# Patient Record
Sex: MALE | Race: WHITE | NOT HISPANIC OR LATINO | URBAN - METROPOLITAN AREA
[De-identification: names, ages, dates, MRNs, and addresses within clinical notes are randomized per-mention and may not be internally consistent; named-entity substitution may affect disease eponyms.]

---

## 2020-10-13 ENCOUNTER — APPOINTMENT (RX ONLY)
Dept: URBAN - METROPOLITAN AREA OTHER 5 | Facility: OTHER | Age: 65
Setting detail: DERMATOLOGY
End: 2020-10-13

## 2020-10-13 DIAGNOSIS — D22 MELANOCYTIC NEVI: ICD-10-CM

## 2020-10-13 DIAGNOSIS — L82.1 OTHER SEBORRHEIC KERATOSIS: ICD-10-CM

## 2020-10-13 DIAGNOSIS — Z87.2 PERSONAL HISTORY OF DISEASES OF THE SKIN AND SUBCUTANEOUS TISSUE: ICD-10-CM

## 2020-10-13 PROBLEM — D22.61 MELANOCYTIC NEVI OF RIGHT UPPER LIMB, INCLUDING SHOULDER: Status: ACTIVE | Noted: 2020-10-13

## 2020-10-13 PROCEDURE — ? COUNSELING

## 2020-10-13 PROCEDURE — 99203 OFFICE O/P NEW LOW 30 MIN: CPT

## 2020-10-13 ASSESSMENT — LOCATION SIMPLE DESCRIPTION DERM
LOCATION SIMPLE: RIGHT THUMB
LOCATION SIMPLE: SCALP
LOCATION SIMPLE: RIGHT UPPER BACK
LOCATION SIMPLE: RIGHT LOWER BACK
LOCATION SIMPLE: CHEST

## 2020-10-13 ASSESSMENT — LOCATION DETAILED DESCRIPTION DERM
LOCATION DETAILED: RIGHT DORSAL THUMB METACARPOPHALANGEAL JOINT
LOCATION DETAILED: RIGHT SUPERIOR MEDIAL LOWER BACK
LOCATION DETAILED: RIGHT MID-UPPER BACK
LOCATION DETAILED: STERNUM
LOCATION DETAILED: LEFT SUPERIOR PARIETAL SCALP

## 2020-10-13 ASSESSMENT — LOCATION ZONE DERM
LOCATION ZONE: TRUNK
LOCATION ZONE: FINGER
LOCATION ZONE: SCALP

## 2021-02-05 ENCOUNTER — APPOINTMENT (RX ONLY)
Dept: URBAN - METROPOLITAN AREA OTHER 5 | Facility: OTHER | Age: 66
Setting detail: DERMATOLOGY
End: 2021-02-05

## 2021-02-05 DIAGNOSIS — L259 CONTACT DERMATITIS AND OTHER ECZEMA, UNSPECIFIED CAUSE: ICD-10-CM

## 2021-02-05 DIAGNOSIS — D69.2 OTHER NONTHROMBOCYTOPENIC PURPURA: ICD-10-CM

## 2021-02-05 PROBLEM — L23.9 ALLERGIC CONTACT DERMATITIS, UNSPECIFIED CAUSE: Status: ACTIVE | Noted: 2021-02-05

## 2021-02-05 PROCEDURE — ? PRESCRIPTION

## 2021-02-05 PROCEDURE — ? COUNSELING

## 2021-02-05 PROCEDURE — 99213 OFFICE O/P EST LOW 20 MIN: CPT

## 2021-02-05 RX ORDER — DESONIDE 0.5 MG/G
CREAM TOPICAL BID X 2 WEEKS
Qty: 15 | Refills: 3 | Status: ERX | COMMUNITY
Start: 2021-02-05

## 2021-02-05 RX ADMIN — DESONIDE: 0.5 CREAM TOPICAL at 00:00

## 2021-02-05 ASSESSMENT — LOCATION SIMPLE DESCRIPTION DERM
LOCATION SIMPLE: LEFT CHEEK
LOCATION SIMPLE: RIGHT FOREARM

## 2021-02-05 ASSESSMENT — LOCATION DETAILED DESCRIPTION DERM
LOCATION DETAILED: LEFT SUPERIOR CENTRAL MALAR CHEEK
LOCATION DETAILED: RIGHT DISTAL DORSAL FOREARM

## 2021-02-05 ASSESSMENT — LOCATION ZONE DERM
LOCATION ZONE: FACE
LOCATION ZONE: ARM

## 2021-04-29 ENCOUNTER — OFFICE VISIT (OUTPATIENT)
Dept: URBAN - METROPOLITAN AREA TELEHEALTH 2 | Facility: TELEHEALTH | Age: 66
End: 2021-04-29
Payer: COMMERCIAL

## 2021-04-29 ENCOUNTER — LAB OUTSIDE AN ENCOUNTER (OUTPATIENT)
Dept: URBAN - METROPOLITAN AREA TELEHEALTH 2 | Facility: TELEHEALTH | Age: 66
End: 2021-04-29

## 2021-04-29 ENCOUNTER — TELEPHONE ENCOUNTER (OUTPATIENT)
Dept: URBAN - METROPOLITAN AREA CLINIC 92 | Facility: CLINIC | Age: 66
End: 2021-04-29

## 2021-04-29 DIAGNOSIS — K21.9 GERD (GASTROESOPHAGEAL REFLUX DISEASE): ICD-10-CM

## 2021-04-29 DIAGNOSIS — R93.5 ABNORMAL ABDOMINAL CT SCAN: ICD-10-CM

## 2021-04-29 DIAGNOSIS — R05 COUGH: ICD-10-CM

## 2021-04-29 PROCEDURE — 99245 OFF/OP CONSLTJ NEW/EST HI 55: CPT | Performed by: INTERNAL MEDICINE

## 2021-04-29 PROCEDURE — 99205 OFFICE O/P NEW HI 60 MIN: CPT | Performed by: INTERNAL MEDICINE

## 2021-04-29 RX ORDER — PANTOPRAZOLE SODIUM 40 MG/1
1 TABLET TABLET, DELAYED RELEASE ORAL ONCE A DAY
Qty: 90 | Refills: 4 | OUTPATIENT
Start: 2021-04-29

## 2021-04-29 NOTE — HPI-TODAY'S VISIT:
Pt here for evaluation of GERD and abnormal CT scan of abdomen. . Pt is referred by Dr. Brayan Smith and Dr. Trae Hadley for evauation of abnormal thickening seen on CT scan;  a copy of the note was sent to the referring doctors.   . Pt reports that he had COVID and was hospitalized in 2020 for long time and got better after convalescent plasma.  He has prior h/o hiatal hernia seen on CT scan.  Has had issues of sinus infection and runny nose in the past.  Today on 4/29/2021, pt reports the has chronic dry cough.  He has been seeing Dr. Hawkins for pulmonary issues.  He has reflux with spicy foods. No nausea, trouble swallowing, no hiccups, no diarrhea, no abdominal pain. . PMH: sleep apnea, DM, CAD, prostate cancer (in remission), HTN SH: no smoke or etoh.  [place]/makes awards Meds; simvastatin, tamsulosin, junevia, protonix 40mg (just started), asprin, mvi FH: No GI malignancy .

## 2021-05-13 ENCOUNTER — CLAIMS CREATED FROM THE CLAIM WINDOW (OUTPATIENT)
Dept: URBAN - METROPOLITAN AREA CLINIC 4 | Facility: CLINIC | Age: 66
End: 2021-05-13
Payer: COMMERCIAL

## 2021-05-13 ENCOUNTER — OFFICE VISIT (OUTPATIENT)
Dept: URBAN - METROPOLITAN AREA SURGERY CENTER 18 | Facility: SURGERY CENTER | Age: 66
End: 2021-05-13
Payer: COMMERCIAL

## 2021-05-13 DIAGNOSIS — K31.89 ACQUIRED DEFORMITY OF DUODENUM: ICD-10-CM

## 2021-05-13 DIAGNOSIS — R93.3 ABN FINDINGS-GI TRACT: ICD-10-CM

## 2021-05-13 DIAGNOSIS — K31.89 GASTRIC PERFORATION WITHOUT ULCER: ICD-10-CM

## 2021-05-13 PROCEDURE — G8907 PT DOC NO EVENTS ON DISCHARG: HCPCS | Performed by: INTERNAL MEDICINE

## 2021-05-13 PROCEDURE — 88312 SPECIAL STAINS GROUP 1: CPT | Performed by: PATHOLOGY

## 2021-05-13 PROCEDURE — 43239 EGD BIOPSY SINGLE/MULTIPLE: CPT | Performed by: INTERNAL MEDICINE

## 2021-05-13 PROCEDURE — 88305 TISSUE EXAM BY PATHOLOGIST: CPT | Performed by: PATHOLOGY

## 2021-05-13 RX ORDER — PANTOPRAZOLE SODIUM 40 MG/1
1 TABLET TABLET, DELAYED RELEASE ORAL ONCE A DAY
Status: DISCONTINUED | COMMUNITY

## 2021-05-13 RX ORDER — SITAGLIPTIN 100 MG/1
1 TABLET TABLET, FILM COATED ORAL ONCE A DAY
Status: ACTIVE | COMMUNITY

## 2021-05-13 RX ORDER — GLUCOSAMINE/CHONDR SU A SOD 750-600 MG
1 CAPSULE TABLET ORAL ONCE A DAY
Status: ACTIVE | COMMUNITY

## 2021-05-13 RX ORDER — SIMVASTATIN 40 MG/1
1 TABLET IN THE EVENING TABLET, FILM COATED ORAL ONCE A DAY
Status: ACTIVE | COMMUNITY

## 2021-05-13 RX ORDER — ASCORBIC ACID 1000 MG
1 TABLET TABLET ORAL ONCE A DAY
Status: ACTIVE | COMMUNITY

## 2021-05-13 RX ORDER — METFORMIN HYDROCHLORIDE 500 MG/1
1 TABLET WITH A MEAL TABLET, FILM COATED ORAL ONCE A DAY
Status: ACTIVE | COMMUNITY

## 2021-05-13 RX ORDER — ASPIRIN 81 MG/1
1 TABLET TABLET, CHEWABLE ORAL ONCE A DAY
Status: ACTIVE | COMMUNITY

## 2021-05-13 RX ORDER — TAMSULOSIN HYDROCHLORIDE 0.4 MG/1
1 CAPSULE CAPSULE ORAL ONCE A DAY
Status: ACTIVE | COMMUNITY

## 2021-05-13 RX ORDER — PANTOPRAZOLE SODIUM 40 MG/1
1 TABLET TABLET, DELAYED RELEASE ORAL ONCE A DAY
Qty: 90 | Refills: 4 | COMMUNITY
Start: 2021-04-29

## 2021-05-13 RX ORDER — LOSARTAN POTASSIUM 25 MG/1
1 TABLET TABLET ORAL ONCE A DAY
Status: ACTIVE | COMMUNITY

## 2022-05-16 ENCOUNTER — TELEPHONE ENCOUNTER (OUTPATIENT)
Dept: URBAN - METROPOLITAN AREA CLINIC 92 | Facility: CLINIC | Age: 67
End: 2022-05-16

## 2022-05-16 ENCOUNTER — LAB OUTSIDE AN ENCOUNTER (OUTPATIENT)
Dept: URBAN - METROPOLITAN AREA TELEHEALTH 2 | Facility: TELEHEALTH | Age: 67
End: 2022-05-16

## 2022-05-16 ENCOUNTER — OFFICE VISIT (OUTPATIENT)
Dept: URBAN - METROPOLITAN AREA TELEHEALTH 2 | Facility: TELEHEALTH | Age: 67
End: 2022-05-16
Payer: COMMERCIAL

## 2022-05-16 DIAGNOSIS — R05.8 DRY COUGH: ICD-10-CM

## 2022-05-16 DIAGNOSIS — K21.9 GERD (GASTROESOPHAGEAL REFLUX DISEASE): ICD-10-CM

## 2022-05-16 DIAGNOSIS — D50.8 ACQUIRED IRON DEFICIENCY ANEMIA DUE TO DECREASED ABSORPTION: ICD-10-CM

## 2022-05-16 DIAGNOSIS — R93.5 ABNORMAL ABDOMINAL CT SCAN: ICD-10-CM

## 2022-05-16 DIAGNOSIS — Z12.11 SCREEN FOR COLON CANCER: ICD-10-CM

## 2022-05-16 PROBLEM — 87522002 IRON DEFICIENCY ANEMIA: Status: ACTIVE | Noted: 2022-05-16

## 2022-05-16 PROCEDURE — 99214 OFFICE O/P EST MOD 30 MIN: CPT | Performed by: INTERNAL MEDICINE

## 2022-05-16 RX ORDER — LOSARTAN POTASSIUM 25 MG/1
1 TABLET TABLET ORAL ONCE A DAY
Status: ACTIVE | COMMUNITY

## 2022-05-16 RX ORDER — PANTOPRAZOLE SODIUM 40 MG/1
1 TABLET TABLET, DELAYED RELEASE ORAL ONCE A DAY
Qty: 90 | Refills: 4 | COMMUNITY
Start: 2021-04-29

## 2022-05-16 RX ORDER — PANTOPRAZOLE SODIUM 40 MG/1
1 TABLET TABLET, DELAYED RELEASE ORAL ONCE A DAY
Qty: 90 | Refills: 4 | OUTPATIENT

## 2022-05-16 RX ORDER — GLUCOSAMINE/CHONDR SU A SOD 750-600 MG
1 CAPSULE TABLET ORAL ONCE A DAY
Status: ACTIVE | COMMUNITY

## 2022-05-16 RX ORDER — SIMVASTATIN 40 MG/1
1 TABLET IN THE EVENING TABLET, FILM COATED ORAL ONCE A DAY
Status: ACTIVE | COMMUNITY

## 2022-05-16 RX ORDER — METFORMIN HYDROCHLORIDE 500 MG/1
1 TABLET WITH A MEAL TABLET, FILM COATED ORAL ONCE A DAY
Status: ACTIVE | COMMUNITY

## 2022-05-16 RX ORDER — ASPIRIN 81 MG/1
1 TABLET TABLET, CHEWABLE ORAL ONCE A DAY
Status: ACTIVE | COMMUNITY

## 2022-05-16 RX ORDER — SITAGLIPTIN 100 MG/1
1 TABLET TABLET, FILM COATED ORAL ONCE A DAY
Status: ACTIVE | COMMUNITY

## 2022-05-16 RX ORDER — TAMSULOSIN HYDROCHLORIDE 0.4 MG/1
1 CAPSULE CAPSULE ORAL ONCE A DAY
Status: ACTIVE | COMMUNITY

## 2022-05-16 RX ORDER — ASCORBIC ACID 1000 MG
1 TABLET TABLET ORAL ONCE A DAY
Status: ACTIVE | COMMUNITY

## 2022-05-16 NOTE — HPI-TODAY'S VISIT:
Pt here for evaluation of GERD and abnormal CT scan of abdomen. . Pt is referred by Dr. Brayan Smith and Dr. Trae Hadley for evauation of abnormal thickening seen on CT scan;  a copy of the note was sent to the referring doctors.  . Pt reports that he had COVID and was hospitalized in 2020 for long time and got better after convalescent plasma.  He has prior h/o hiatal hernia seen on CT scan.  Has had issues of sinus infection and runny nose in the past.  Previously on 4/29/2021, pt reports the has chronic dry cough.  He has been seeing Dr. Hawkins for pulmonary issues.  He has reflux with spicy foods. No nausea, trouble swallowing, no hiccups, no diarrhea, no abdominal pain. . Today on 5/16/2022, pt reports that his reflux is very well controlled on protonix. His Hgb is going down again.  He has been donating plasma/platelets  (due to having AB for covid). . PMH: sleep apnea, DM, CAD, prostate cancer (in remission), HTN SH: no smoke or etoh.  [place]/makes awards Meds; simvastatin, tamsulosin, junevia, protonix 40mg (just started), asprin, mvi FH: No GI malignancy .  5/2021: Stomach, Biopsy: NO SIGNIFICANT ABNORMALITY. . The examined esophagus was normal. The examined duodenum was normal. : Normal Diffuse moderate inflammation characterized by erosions and erythema was found in the entire examined stomach. Biopsies were taken with a cold forceps for histology. The pathology specimen was placed into Bottle Number 1. A medium amount of food (residue) was found in the entire examined stomach. . Biopsies: normal.   . OSH HGB: 11/2021: 14.9, 12/2021 14.1; March 2022:13.3

## 2022-05-18 ENCOUNTER — APPOINTMENT (RX ONLY)
Dept: URBAN - METROPOLITAN AREA OTHER 4 | Facility: OTHER | Age: 67
Setting detail: DERMATOLOGY
End: 2022-05-18

## 2022-05-18 DIAGNOSIS — L82.0 INFLAMED SEBORRHEIC KERATOSIS: ICD-10-CM

## 2022-05-18 PROCEDURE — ? LIQUID NITROGEN

## 2022-05-18 PROCEDURE — 17110 DESTRUCTION B9 LES UP TO 14: CPT

## 2022-05-18 PROCEDURE — ? COUNSELING

## 2022-05-18 ASSESSMENT — LOCATION ZONE DERM: LOCATION ZONE: SCALP

## 2022-05-18 ASSESSMENT — LOCATION DETAILED DESCRIPTION DERM: LOCATION DETAILED: RIGHT CENTRAL FRONTAL SCALP

## 2022-05-18 ASSESSMENT — LOCATION SIMPLE DESCRIPTION DERM: LOCATION SIMPLE: RIGHT SCALP

## 2022-05-18 NOTE — PROCEDURE: LIQUID NITROGEN
Medical Necessity Information: It is in your best interest to select a reason for this procedure from the list below. All of these items fulfill various CMS LCD requirements except the new and changing color options.
Add 52 Modifier (Optional): no
Consent: Pt advised on the following including but not limited to risks of crusting, scabbing, blistering, scarring, darker or lighter pigmentary change, recurrence, incomplete removal and infection.
Render Post-Care Instructions In Note?: yes
Detail Level: Simple
Pared With?: 15 blade
Medical Necessity Clause: This procedure was medically necessary because the lesions that were treated were:
Duration Of Freeze Thaw-Cycle (Seconds): 5-10
Post-Care Instructions: I reviewed with the patient in detail post-care instructions. Patient is to wear sunprotection, and avoid picking at any of the treated lesions. Pt may apply Vaseline to crusted or scabbing areas.
Spray Paint Text: The liquid nitrogen was applied to the skin utilizing a spray paint frosting technique.

## 2022-06-01 ENCOUNTER — TELEPHONE ENCOUNTER (OUTPATIENT)
Dept: URBAN - METROPOLITAN AREA CLINIC 92 | Facility: CLINIC | Age: 67
End: 2022-06-01

## 2022-06-01 RX ORDER — POLYETHYLENE GLYCOL 3350, SODIUM SULFATE, SODIUM CHLORIDE, POTASSIUM CHLORIDE, ASCORBIC ACID, SODIUM ASCORBATE 140-9-5.2G
1 KIT KIT ORAL ONCE
Qty: 1 | Refills: 1 | OUTPATIENT
Start: 2022-06-01 | End: 2022-06-03

## 2022-06-27 ENCOUNTER — TELEPHONE ENCOUNTER (OUTPATIENT)
Dept: URBAN - METROPOLITAN AREA CLINIC 92 | Facility: CLINIC | Age: 67
End: 2022-06-27

## 2022-06-27 RX ORDER — PANTOPRAZOLE SODIUM 40 MG/1
1 TABLET TABLET, DELAYED RELEASE ORAL ONCE A DAY
Qty: 90 | Refills: 4

## 2022-07-06 PROBLEM — 235595009 GASTROESOPHAGEAL REFLUX DISEASE: Status: ACTIVE | Noted: 2021-04-29

## 2022-07-15 ENCOUNTER — WEB ENCOUNTER (OUTPATIENT)
Dept: URBAN - METROPOLITAN AREA SURGERY CENTER 18 | Facility: SURGERY CENTER | Age: 67
End: 2022-07-15

## 2022-07-19 ENCOUNTER — WEB ENCOUNTER (OUTPATIENT)
Dept: URBAN - METROPOLITAN AREA SURGERY CENTER 18 | Facility: SURGERY CENTER | Age: 67
End: 2022-07-19

## 2022-07-21 ENCOUNTER — CLAIMS CREATED FROM THE CLAIM WINDOW (OUTPATIENT)
Dept: URBAN - METROPOLITAN AREA CLINIC 4 | Facility: CLINIC | Age: 67
End: 2022-07-21
Payer: COMMERCIAL

## 2022-07-21 ENCOUNTER — OFFICE VISIT (OUTPATIENT)
Dept: URBAN - METROPOLITAN AREA SURGERY CENTER 18 | Facility: SURGERY CENTER | Age: 67
End: 2022-07-21
Payer: COMMERCIAL

## 2022-07-21 DIAGNOSIS — K63.89 OTHER SPECIFIED DISEASES OF INTESTINE: ICD-10-CM

## 2022-07-21 DIAGNOSIS — D50.0 ANEMIA: ICD-10-CM

## 2022-07-21 DIAGNOSIS — D12.3 BENIGN NEOPLASM OF TRANSVERSE COLON: ICD-10-CM

## 2022-07-21 DIAGNOSIS — K63.5 BENIGN COLON POLYP: ICD-10-CM

## 2022-07-21 DIAGNOSIS — K29.70 GASTRITIS, UNSPECIFIED, WITHOUT BLEEDING: ICD-10-CM

## 2022-07-21 DIAGNOSIS — D12.3 ADENOMA OF TRANSVERSE COLON: ICD-10-CM

## 2022-07-21 DIAGNOSIS — Z12.11 COLON CANCER SCREENING: ICD-10-CM

## 2022-07-21 DIAGNOSIS — K31.89 ACQUIRED DEFORMITY OF DUODENUM: ICD-10-CM

## 2022-07-21 DIAGNOSIS — K31.819 ACQUIRED ARTERIOVENOUS MALFORMATION OF STOMACH: ICD-10-CM

## 2022-07-21 DIAGNOSIS — K31.89 OTHER DISEASES OF STOMACH AND DUODENUM: ICD-10-CM

## 2022-07-21 PROCEDURE — 45380 COLONOSCOPY AND BIOPSY: CPT | Performed by: INTERNAL MEDICINE

## 2022-07-21 PROCEDURE — 45385 COLONOSCOPY W/LESION REMOVAL: CPT | Performed by: INTERNAL MEDICINE

## 2022-07-21 PROCEDURE — 88312 SPECIAL STAINS GROUP 1: CPT | Performed by: PATHOLOGY

## 2022-07-21 PROCEDURE — 88305 TISSUE EXAM BY PATHOLOGIST: CPT | Performed by: PATHOLOGY

## 2022-07-21 PROCEDURE — 43239 EGD BIOPSY SINGLE/MULTIPLE: CPT | Performed by: INTERNAL MEDICINE

## 2022-07-21 PROCEDURE — G8907 PT DOC NO EVENTS ON DISCHARG: HCPCS | Performed by: INTERNAL MEDICINE

## 2022-07-21 RX ORDER — ASCORBIC ACID 1000 MG
1 TABLET TABLET ORAL ONCE A DAY
Status: ACTIVE | COMMUNITY

## 2022-07-21 RX ORDER — SITAGLIPTIN 100 MG/1
1 TABLET TABLET, FILM COATED ORAL ONCE A DAY
Status: ACTIVE | COMMUNITY

## 2022-07-21 RX ORDER — PANTOPRAZOLE SODIUM 40 MG/1
1 TABLET TABLET, DELAYED RELEASE ORAL ONCE A DAY
Qty: 90 | Refills: 4 | Status: ACTIVE | COMMUNITY

## 2022-07-21 RX ORDER — GLUCOSAMINE/CHONDR SU A SOD 750-600 MG
1 CAPSULE TABLET ORAL ONCE A DAY
Status: ACTIVE | COMMUNITY

## 2022-07-21 RX ORDER — ASPIRIN 81 MG/1
1 TABLET TABLET, CHEWABLE ORAL ONCE A DAY
Status: ACTIVE | COMMUNITY

## 2022-07-21 RX ORDER — LOSARTAN POTASSIUM 25 MG/1
1 TABLET TABLET ORAL ONCE A DAY
Status: ACTIVE | COMMUNITY

## 2022-07-21 RX ORDER — TAMSULOSIN HYDROCHLORIDE 0.4 MG/1
1 CAPSULE CAPSULE ORAL ONCE A DAY
Status: ACTIVE | COMMUNITY

## 2022-07-21 RX ORDER — SIMVASTATIN 40 MG/1
1 TABLET IN THE EVENING TABLET, FILM COATED ORAL ONCE A DAY
Status: ACTIVE | COMMUNITY

## 2022-07-21 RX ORDER — METFORMIN HYDROCHLORIDE 500 MG/1
1 TABLET WITH A MEAL TABLET, FILM COATED ORAL ONCE A DAY
Status: ACTIVE | COMMUNITY

## 2022-07-22 ENCOUNTER — TELEPHONE ENCOUNTER (OUTPATIENT)
Dept: URBAN - METROPOLITAN AREA CLINIC 92 | Facility: CLINIC | Age: 67
End: 2022-07-22

## 2022-07-22 RX ORDER — PANTOPRAZOLE SODIUM 40 MG/1
1 TABLET TABLET, DELAYED RELEASE ORAL ONCE A DAY
Qty: 90 | Refills: 4

## 2022-08-16 ENCOUNTER — TELEPHONE ENCOUNTER (OUTPATIENT)
Dept: URBAN - METROPOLITAN AREA CLINIC 96 | Facility: CLINIC | Age: 67
End: 2022-08-16

## 2023-05-18 ENCOUNTER — APPOINTMENT (RX ONLY)
Dept: URBAN - METROPOLITAN AREA OTHER 5 | Facility: OTHER | Age: 68
Setting detail: DERMATOLOGY
End: 2023-05-18

## 2023-05-18 DIAGNOSIS — L82.1 OTHER SEBORRHEIC KERATOSIS: ICD-10-CM

## 2023-05-18 DIAGNOSIS — L57.8 OTHER SKIN CHANGES DUE TO CHRONIC EXPOSURE TO NONIONIZING RADIATION: ICD-10-CM

## 2023-05-18 DIAGNOSIS — D18.0 HEMANGIOMA: ICD-10-CM

## 2023-05-18 PROBLEM — D18.01 HEMANGIOMA OF SKIN AND SUBCUTANEOUS TISSUE: Status: ACTIVE | Noted: 2023-05-18

## 2023-05-18 PROCEDURE — 99213 OFFICE O/P EST LOW 20 MIN: CPT

## 2023-05-18 PROCEDURE — ? COUNSELING

## 2023-05-18 ASSESSMENT — LOCATION SIMPLE DESCRIPTION DERM
LOCATION SIMPLE: RIGHT SCALP
LOCATION SIMPLE: SCALP
LOCATION SIMPLE: RIGHT UPPER BACK

## 2023-05-18 ASSESSMENT — LOCATION DETAILED DESCRIPTION DERM
LOCATION DETAILED: LEFT SUPERIOR PARIETAL SCALP
LOCATION DETAILED: RIGHT CENTRAL FRONTAL SCALP
LOCATION DETAILED: RIGHT SUPERIOR UPPER BACK

## 2023-05-18 ASSESSMENT — LOCATION ZONE DERM
LOCATION ZONE: TRUNK
LOCATION ZONE: SCALP

## 2023-05-18 ASSESSMENT — PAIN INTENSITY VAS: HOW INTENSE IS YOUR PAIN 0 BEING NO PAIN, 10 BEING THE MOST SEVERE PAIN POSSIBLE?: NO PAIN

## 2023-06-07 ENCOUNTER — APPOINTMENT (RX ONLY)
Dept: URBAN - METROPOLITAN AREA OTHER 5 | Facility: OTHER | Age: 68
Setting detail: DERMATOLOGY
End: 2023-06-07

## 2023-06-07 DIAGNOSIS — L82.0 INFLAMED SEBORRHEIC KERATOSIS: ICD-10-CM

## 2023-06-07 PROCEDURE — ? LIQUID NITROGEN

## 2023-06-07 PROCEDURE — 17110 DESTRUCTION B9 LES UP TO 14: CPT

## 2023-06-07 PROCEDURE — ? COUNSELING

## 2023-06-07 ASSESSMENT — LOCATION ZONE DERM: LOCATION ZONE: SCALP

## 2023-06-07 ASSESSMENT — LOCATION SIMPLE DESCRIPTION DERM: LOCATION SIMPLE: ANTERIOR SCALP

## 2023-06-07 ASSESSMENT — PAIN INTENSITY VAS: HOW INTENSE IS YOUR PAIN 0 BEING NO PAIN, 10 BEING THE MOST SEVERE PAIN POSSIBLE?: NO PAIN

## 2023-06-07 ASSESSMENT — LOCATION DETAILED DESCRIPTION DERM: LOCATION DETAILED: MID-FRONTAL SCALP

## 2023-06-07 NOTE — PROCEDURE: LIQUID NITROGEN
Consent: The patient's consent was obtained including but not limited to risks of crusting, scabbing, blistering, scarring, darker or lighter pigmentary change, recurrence, incomplete removal and infection.
Detail Level: Detailed
Show Topical Anesthesia Variable?: Yes
Medical Necessity Information: It is in your best interest to select a reason for this procedure from the list below. All of these items fulfill various CMS LCD requirements except the new and changing color options.
Post-Care Instructions: I reviewed with the patient in detail post-care instructions. Patient is to wear sunprotection, and avoid picking at any of the treated lesions. Pt may apply Vaseline to crusted or scabbing areas.
Render Post-Care Instructions In Note?: no
Spray Paint Text: The liquid nitrogen was applied to the skin utilizing a spray paint frosting technique.
Medical Necessity Clause: This procedure was medically necessary because the lesions that were treated were:

## 2023-09-05 ENCOUNTER — TELEPHONE ENCOUNTER (OUTPATIENT)
Dept: URBAN - METROPOLITAN AREA SURGERY CENTER 8 | Facility: SURGERY CENTER | Age: 68
End: 2023-09-05

## 2023-09-08 ENCOUNTER — OFFICE VISIT (OUTPATIENT)
Dept: URBAN - METROPOLITAN AREA SURGERY CENTER 8 | Facility: SURGERY CENTER | Age: 68
End: 2023-09-08

## 2023-09-12 ENCOUNTER — TELEPHONE ENCOUNTER (OUTPATIENT)
Dept: URBAN - METROPOLITAN AREA CLINIC 96 | Facility: CLINIC | Age: 68
End: 2023-09-12

## 2023-09-12 RX ORDER — PANTOPRAZOLE SODIUM 40 MG/1
1 TABLET TABLET, DELAYED RELEASE ORAL ONCE A DAY
Qty: 90 | Refills: 4

## 2023-09-20 ENCOUNTER — WEB ENCOUNTER (OUTPATIENT)
Dept: URBAN - METROPOLITAN AREA CLINIC 96 | Facility: CLINIC | Age: 68
End: 2023-09-20

## 2023-09-20 ENCOUNTER — LAB OUTSIDE AN ENCOUNTER (OUTPATIENT)
Dept: URBAN - METROPOLITAN AREA CLINIC 96 | Facility: CLINIC | Age: 68
End: 2023-09-20

## 2023-09-20 ENCOUNTER — OFFICE VISIT (OUTPATIENT)
Dept: URBAN - METROPOLITAN AREA CLINIC 96 | Facility: CLINIC | Age: 68
End: 2023-09-20
Payer: COMMERCIAL

## 2023-09-20 ENCOUNTER — OFFICE VISIT (OUTPATIENT)
Dept: URBAN - METROPOLITAN AREA CLINIC 12 | Facility: CLINIC | Age: 68
End: 2023-09-20

## 2023-09-20 ENCOUNTER — DASHBOARD ENCOUNTERS (OUTPATIENT)
Age: 68
End: 2023-09-20

## 2023-09-20 VITALS
TEMPERATURE: 97.9 F | WEIGHT: 164 LBS | DIASTOLIC BLOOD PRESSURE: 84 MMHG | BODY MASS INDEX: 24.86 KG/M2 | HEIGHT: 68 IN | HEART RATE: 72 BPM | SYSTOLIC BLOOD PRESSURE: 141 MMHG

## 2023-09-20 DIAGNOSIS — K92.1 MELENA: ICD-10-CM

## 2023-09-20 DIAGNOSIS — K21.9 GERD (GASTROESOPHAGEAL REFLUX DISEASE): ICD-10-CM

## 2023-09-20 DIAGNOSIS — R93.5 ABNORMAL ABDOMINAL CT SCAN: ICD-10-CM

## 2023-09-20 DIAGNOSIS — D50.8 OTHER IRON DEFICIENCY ANEMIA: ICD-10-CM

## 2023-09-20 PROCEDURE — 99214 OFFICE O/P EST MOD 30 MIN: CPT | Performed by: INTERNAL MEDICINE

## 2023-09-20 RX ORDER — TAMSULOSIN HYDROCHLORIDE 0.4 MG/1
1 CAPSULE CAPSULE ORAL ONCE A DAY
Status: ACTIVE | COMMUNITY

## 2023-09-20 RX ORDER — LOSARTAN POTASSIUM 25 MG/1
1 TABLET TABLET ORAL ONCE A DAY
Status: ACTIVE | COMMUNITY

## 2023-09-20 RX ORDER — SIMVASTATIN 40 MG/1
1 TABLET IN THE EVENING TABLET, FILM COATED ORAL ONCE A DAY
Status: ACTIVE | COMMUNITY

## 2023-09-20 RX ORDER — PANTOPRAZOLE SODIUM 40 MG/1
1 TABLET TABLET, DELAYED RELEASE ORAL ONCE A DAY
Qty: 90 | Refills: 4 | Status: ACTIVE | COMMUNITY

## 2023-09-20 RX ORDER — ASCORBIC ACID 1000 MG
1 TABLET TABLET ORAL ONCE A DAY
Status: ACTIVE | COMMUNITY

## 2023-09-20 RX ORDER — SITAGLIPTIN 100 MG/1
1 TABLET TABLET, FILM COATED ORAL ONCE A DAY
Status: ACTIVE | COMMUNITY

## 2023-09-20 RX ORDER — ASPIRIN 81 MG/1
1 TABLET TABLET, CHEWABLE ORAL ONCE A DAY
Status: ACTIVE | COMMUNITY

## 2023-09-20 RX ORDER — METFORMIN HYDROCHLORIDE 500 MG/1
1 TABLET WITH A MEAL TABLET, FILM COATED ORAL ONCE A DAY
Status: ACTIVE | COMMUNITY

## 2023-09-20 RX ORDER — GLUCOSAMINE/CHONDR SU A SOD 750-600 MG
1 CAPSULE TABLET ORAL ONCE A DAY
Status: ACTIVE | COMMUNITY

## 2023-09-20 RX ORDER — PANTOPRAZOLE SODIUM 40 MG/1
1 TABLET TABLET, DELAYED RELEASE ORAL EVERY 12 HOURS
Qty: 180 TABLET | Refills: 4 | OUTPATIENT

## 2023-09-20 NOTE — HPI-TODAY'S VISIT:
Pt here for evaluation of GERD and abnormal CT scan of abdomen. . Pt is referred by Dr. Brayan Smith and Dr. Trae Hadley for evauation of abnormal thickening seen on CT scan;  a copy of the note was sent to the referring doctors.  . Pt reports that he had COVID and was hospitalized in 2020 for long time and got better after convalescent plasma.  He has prior h/o hiatal hernia seen on CT scan.  Has had issues of sinus infection and runny nose in the past.  Previously on 4/29/2021, pt reports the has chronic dry cough.  He has been seeing Dr. Hawkins for pulmonary issues.  He has reflux with spicy foods. No nausea, trouble swallowing, no hiccups, no diarrhea, no abdominal pain. . Previously on 5/16/2022, pt reports that his reflux is very well controlled on protonix. His Hgb is going down again.  He has been donating plasma/platelets  (due to having AB for covid). . Today on 9/20/2023, pt reports that he has been having new onset melena daily for several weeks.  Having 2-3 BM per day (new).  Having worsening acid reflux. . PMH: sleep apnea, DM, CAD, prostate cancer (in remission), HTN SH: no smoke or etoh.  [place]/makes awards Meds; simvastatin, tamsulosin, junevia, protonix 40mg (just started), asprin, mvi FH: No GI malignancy .  5/2021: Stomach, Biopsy: NO SIGNIFICANT ABNORMALITY. . The examined esophagus was normal. The examined duodenum was normal. : Normal Diffuse moderate inflammation characterized by erosions and erythema was found in the entire examined stomach. Biopsies were taken with a cold forceps for histology. The pathology specimen was placed into Bottle Number 1. A medium amount of food (residue) was found in the entire examined stomach. . Biopsies: normal.  . OSH HGB: 11/2021: 14.9, 12/2021 14.1; March 2022:13.3 . 7/2022: - A single angiodysplastic lesion in the duodenum. - Normal examined duodenum. Biopsied. - Erythematous mucosa in the antrum. Biopsied. - Normal esophagus. - If Fe def anemia persists, will need to have APC performed to treat the AVM. . A less than 5 mm polyp was found in the cecum. The polyp was sessile. The polyp was removed with a jumbo cold forceps. Resection and retrieval were complete. The pathology specimen was placed into Bottle Number 3. : Single Polyp A less than 5 mm polyp was found in the hepatic flexure. The polyp was sessile. The polyp was removed with a jumbo cold forceps. Resection and retrieval were complete. The pathology specimen was placed into Bottle Number 4. A less than 5 mm polyp was found in the transverse colon. The polyp was sessile. The polyp was removed with a jumbo cold forceps. Resection and retrieval were complete. The pathology specimen was placed into Bottle Number 5. A 6 mm polyp was found in the recto-sigmoid colon. The polyp was sessile. The polyp was removed with a cold snare. Resection and retrieval were complete. The pathology specimen was placed into Bottle Number 6. The pathology specimen was placed into Bottle Number 6. A 6 mm polyp was found in the recto-sigmoid colon. The polyp was sessile. The polyp was removed with a cold snare. Resection and retrieval were complete. The pathology specimen was placed into Bottle Number 7. . A) Duodenum, Biopsy: NO SIGNIFICANT ABNORMALITY. (B) Stomach, Antrum, Biopsy: CHEMICAL/REACTIVE GASTROPATHY. No Evidence of H. Pylori Organisms or Intestinal Metaplasia. Negative for Dysplasia or Malignancy. (C) Cecum, Polypectomy: BENIGN MUCOSAL POLYP(S). See Comment. Negative for Dysplasia or Malignancy. COMMENT: We use "benign mucosal polyp" to refer to an endoscopically polypoid lesion that shows no dysplastic or hyperplastic epithelium or classic features of hamartomatous polyp. (D) Colon, Hepatic Flexure, Polypectomy: TUBULAR ADENOMA(S). (E) Colon, Transverse, Polypectomy: HYPERPLASTIC POLYP(S). (F) Colon, Descending, Polypectomy: HYPERPLASTIC POLYP(S). (G) Colon, Recto-Sigmoid, Polypectomy: HYPERPLASTIC POLYP(S).  - Repeat colonoscopy in 3 years for surveillance. .

## 2023-09-29 ENCOUNTER — LAB OUTSIDE AN ENCOUNTER (OUTPATIENT)
Dept: URBAN - METROPOLITAN AREA CLINIC 96 | Facility: CLINIC | Age: 68
End: 2023-09-29

## 2023-09-29 ENCOUNTER — OFFICE VISIT (OUTPATIENT)
Dept: URBAN - METROPOLITAN AREA MEDICAL CENTER 28 | Facility: MEDICAL CENTER | Age: 68
End: 2023-09-29
Payer: COMMERCIAL

## 2023-09-29 DIAGNOSIS — K31.819 ACQUIRED ARTERIOVENOUS MALFORMATION OF DUODENUM: ICD-10-CM

## 2023-09-29 DIAGNOSIS — K92.1 ACUTE MELENA: ICD-10-CM

## 2023-09-29 DIAGNOSIS — D50.9 ANEMIA: ICD-10-CM

## 2023-09-29 LAB
GLUCOSE POC: 140
PERFORMING LAB: (no result)

## 2023-09-29 PROCEDURE — 43270 EGD LESION ABLATION: CPT | Performed by: INTERNAL MEDICINE

## 2023-09-29 RX ORDER — ASPIRIN 81 MG/1
1 TABLET TABLET, CHEWABLE ORAL ONCE A DAY
Status: ACTIVE | COMMUNITY

## 2023-09-29 RX ORDER — ASCORBIC ACID 1000 MG
1 TABLET TABLET ORAL ONCE A DAY
Status: ACTIVE | COMMUNITY

## 2023-09-29 RX ORDER — METFORMIN HYDROCHLORIDE 500 MG/1
1 TABLET WITH A MEAL TABLET, FILM COATED ORAL ONCE A DAY
Status: ACTIVE | COMMUNITY

## 2023-09-29 RX ORDER — TAMSULOSIN HYDROCHLORIDE 0.4 MG/1
1 CAPSULE CAPSULE ORAL ONCE A DAY
Status: ACTIVE | COMMUNITY

## 2023-09-29 RX ORDER — SITAGLIPTIN 100 MG/1
1 TABLET TABLET, FILM COATED ORAL ONCE A DAY
Status: ACTIVE | COMMUNITY

## 2023-09-29 RX ORDER — GLUCOSAMINE/CHONDR SU A SOD 750-600 MG
1 CAPSULE TABLET ORAL ONCE A DAY
Status: ACTIVE | COMMUNITY

## 2023-09-29 RX ORDER — PANTOPRAZOLE SODIUM 40 MG/1
1 TABLET TABLET, DELAYED RELEASE ORAL EVERY 12 HOURS
Qty: 180 TABLET | Refills: 4 | Status: ACTIVE | COMMUNITY

## 2023-09-29 RX ORDER — SIMVASTATIN 40 MG/1
1 TABLET IN THE EVENING TABLET, FILM COATED ORAL ONCE A DAY
Status: ACTIVE | COMMUNITY

## 2023-09-29 RX ORDER — LOSARTAN POTASSIUM 25 MG/1
1 TABLET TABLET ORAL ONCE A DAY
Status: ACTIVE | COMMUNITY

## 2023-09-29 NOTE — HPI-TODAY'S VISIT:
9/29/2023 - The esophagus was normal.  Findings: - The stomach was normal.  - A single less than 1 mm angioectasia without bleeding was found in the first portion of the duodenum.  Coagulation for  hemostasis of bleeding caused by the procedure using argon plasma was successful.

## 2023-12-01 ENCOUNTER — WEB ENCOUNTER (OUTPATIENT)
Dept: URBAN - METROPOLITAN AREA CLINIC 98 | Facility: CLINIC | Age: 68
End: 2023-12-01

## 2023-12-02 ENCOUNTER — WEB ENCOUNTER (OUTPATIENT)
Dept: URBAN - METROPOLITAN AREA CLINIC 96 | Facility: CLINIC | Age: 68
End: 2023-12-02

## 2024-12-16 ENCOUNTER — TELEPHONE ENCOUNTER (OUTPATIENT)
Dept: URBAN - METROPOLITAN AREA CLINIC 96 | Facility: CLINIC | Age: 69
End: 2024-12-16

## 2025-01-02 ENCOUNTER — OFFICE VISIT (OUTPATIENT)
Dept: URBAN - METROPOLITAN AREA CLINIC 96 | Facility: CLINIC | Age: 70
End: 2025-01-02
Payer: COMMERCIAL

## 2025-01-02 VITALS — HEIGHT: 68 IN

## 2025-01-02 DIAGNOSIS — Z86.0101 PERSONAL HISTORY OF ADENOMATOUS AND SERRATED COLON POLYPS: ICD-10-CM

## 2025-01-02 DIAGNOSIS — K21.9 GERD (GASTROESOPHAGEAL REFLUX DISEASE): ICD-10-CM

## 2025-01-02 PROBLEM — 162031009: Status: ACTIVE | Noted: 2025-01-02

## 2025-01-02 PROBLEM — 235853006: Status: ACTIVE | Noted: 2025-01-02

## 2025-01-02 PROCEDURE — 99213 OFFICE O/P EST LOW 20 MIN: CPT

## 2025-01-02 RX ORDER — ASCORBIC ACID 1000 MG
1 TABLET TABLET ORAL ONCE A DAY
Status: ON HOLD | COMMUNITY

## 2025-01-02 RX ORDER — PANTOPRAZOLE SODIUM 20 MG/1
1 TABLET TABLET, DELAYED RELEASE ORAL ONCE A DAY
Qty: 30 | Refills: 1 | OUTPATIENT
Start: 2025-01-02

## 2025-01-02 RX ORDER — SIMVASTATIN 40 MG/1
1 TABLET IN THE EVENING TABLET, FILM COATED ORAL ONCE A DAY
Status: ACTIVE | COMMUNITY

## 2025-01-02 RX ORDER — SITAGLIPTIN 100 MG/1
1 TABLET TABLET, FILM COATED ORAL ONCE A DAY
Status: ON HOLD | COMMUNITY

## 2025-01-02 RX ORDER — TAMSULOSIN HYDROCHLORIDE 0.4 MG/1
1 CAPSULE CAPSULE ORAL ONCE A DAY
Status: ON HOLD | COMMUNITY

## 2025-01-02 RX ORDER — LOSARTAN POTASSIUM 25 MG/1
1 TABLET TABLET ORAL ONCE A DAY
Status: ON HOLD | COMMUNITY

## 2025-01-02 RX ORDER — METFORMIN HYDROCHLORIDE 500 MG/1
1 TABLET WITH A MEAL TABLET, FILM COATED ORAL ONCE A DAY
Status: ACTIVE | COMMUNITY

## 2025-01-02 RX ORDER — ASPIRIN 81 MG/1
1 TABLET TABLET, CHEWABLE ORAL ONCE A DAY
Status: ACTIVE | COMMUNITY

## 2025-01-02 RX ORDER — PANTOPRAZOLE SODIUM 40 MG/1
1 TABLET TABLET, DELAYED RELEASE ORAL EVERY 12 HOURS
Qty: 180 TABLET | Refills: 4 | Status: DISCONTINUED | COMMUNITY

## 2025-01-02 RX ORDER — GLUCOSAMINE/CHONDR SU A SOD 750-600 MG
1 CAPSULE TABLET ORAL ONCE A DAY
Status: ON HOLD | COMMUNITY

## 2025-01-02 NOTE — HPI-OTHER HISTORIES
Pt here for evaluation of GERD and abnormal CT scan of abdomen. . Pt is referred by Dr. Brayan Smith and Dr. Trae Hadley for evauation of abnormal thickening seen on CT scan; a copy of the note was sent to the referring doctors. . Pt reports that he had COVID and was hospitalized in 2020 for long time and got better after convalescent plasma. He has prior h/o hiatal hernia seen on CT scan. Has had issues of sinus infection and runny nose in the past.  Previously on 4/29/2021, pt reports the has chronic dry cough. He has been seeing Dr. Hawkins for pulmonary issues. He has reflux with spicy foods. No nausea, trouble swallowing, no hiccups, no diarrhea, no abdominal pain. . Previously on 5/16/2022, pt reports that his reflux is very well controlled on protonix. His Hgb is going down again. He has been donating plasma/platelets (due to having AB for covid). . Previously on 9/20/2023, pt reports that he has been having new onset melena daily for several weeks. Having 2-3 BM per day (new). Having worsening acid reflux. . PMH: sleep apnea, DM, CAD, prostate cancer (in remission), HTN SH: no smoke or etoh. [place]/makes awards Meds; simvastatin, tamsulosin, junevia, protonix 40mg (just started), asprin, mvi FH: No GI malignancy . 9/29/2023 - The esophagus was normal. Findings: - The stomach was normal. - A single less than 1 mm angioectasia without bleeding was found in the first portion of the duodenum. Coagulation forhemostasis of bleeding caused by the procedure using argon plasma was successful.  . 5/2021: Stomach, Biopsy: NO SIGNIFICANT ABNORMALITY. . The examined esophagus was normal. The examined duodenum was normal. : Normal Diffuse moderate inflammation characterized by erosions and erythema was found in the entire examined stomach. Biopsies were taken with a cold forceps for histology. The pathology specimen was placed into Bottle Number 1. A medium amount of food (residue) was found in the entire examined stomach. . Biopsies: normal. . OSH HGB: 11/2021: 14.9, 12/2021 14.1; March 2022:13.3 . 7/2022: - A single angiodysplastic lesion in the duodenum. - Normal examined duodenum. Biopsied. - Erythematous mucosa in the antrum. Biopsied. - Normal esophagus. - If Fe def anemia persists, will need to have APC performed to treat the AVM. . A less than 5 mm polyp was found in the cecum. The polyp was sessile. The polyp was removed with a jumbo cold forceps. Resection and retrieval were complete. The pathology specimen was placed into Bottle Number 3. : Single Polyp A less than 5 mm polyp was found in the hepatic flexure. The polyp was sessile. The polyp was removed with a jumbo cold forceps. Resection and retrieval were complete. The pathology specimen was placed into Bottle Number 4. A less than 5 mm polyp was found in the transverse colon. The polyp was sessile. The polyp was removed with a jumbo cold forceps. Resection and retrieval were complete. The pathology specimen was placed into Bottle Number 5. A 6 mm polyp was found in the recto-sigmoid colon. The polyp was sessile. The polyp was removed with a cold snare. Resection and retrieval were complete. The pathology specimen was placed into Bottle Number 6. The pathology specimen was placed into Bottle Number 6. A 6 mm polyp was found in the recto-sigmoid colon. The polyp was sessile. The polyp was removed with a cold snare. Resection and retrieval were complete. The pathology specimen was placed into Bottle Number 7. . A) Duodenum, Biopsy: NO SIGNIFICANT ABNORMALITY. (B) Stomach, Antrum, Biopsy: CHEMICAL/REACTIVE GASTROPATHY. No Evidence of H. Pylori Organisms or Intestinal Metaplasia. Negative for Dysplasia or Malignancy. (C) Cecum, Polypectomy: BENIGN MUCOSAL POLYP(S). See Comment. Negative for Dysplasia or Malignancy. COMMENT: We use "benign mucosal polyp" to refer to an endoscopically polypoid lesion that shows no dysplastic or hyperplastic epithelium or classic features of hamartomatous polyp. (D) Colon, Hepatic Flexure, Polypectomy: TUBULAR ADENOMA(S). (E) Colon, Transverse, Polypectomy: HYPERPLASTIC POLYP(S). (F) Colon, Descending, Polypectomy: HYPERPLASTIC POLYP(S). (G) Colon, Recto-Sigmoid, Polypectomy: HYPERPLASTIC POLYP(S).  - Repeat colonoscopy in 3 years for surveillance.

## 2025-01-02 NOTE — HPI-TODAY'S VISIT:
69 year old male presents for evaluation of GERD. Patient states he was diagnosed with pernicious anemia - which he read was likely due to PPI. He stopped taking his daily Pantoprazole 5-6 months ago. A few weeks later, he began to experience post-prandial distress and heartburn. Symptoms mildly improved with 2 TUMs once a day. He was recently told his Calcium levels were elevated and to follow-up with his endocrinologist about possible parathyroid disease. . Had labs completed recently.  Pt forgot his phone in car and unable to remember results.  Not sure if he had anemia or not.

## 2025-02-11 ENCOUNTER — OFFICE VISIT (OUTPATIENT)
Dept: URBAN - METROPOLITAN AREA TELEHEALTH 2 | Facility: TELEHEALTH | Age: 70
End: 2025-02-11
Payer: COMMERCIAL

## 2025-02-11 ENCOUNTER — LAB OUTSIDE AN ENCOUNTER (OUTPATIENT)
Dept: URBAN - METROPOLITAN AREA TELEHEALTH 2 | Facility: TELEHEALTH | Age: 70
End: 2025-02-11

## 2025-02-11 ENCOUNTER — APPOINTMENT (OUTPATIENT)
Dept: URBAN - METROPOLITAN AREA OTHER 5 | Facility: OTHER | Age: 70
Setting detail: DERMATOLOGY
End: 2025-02-11

## 2025-02-11 VITALS — BODY MASS INDEX: 25.01 KG/M2 | WEIGHT: 165 LBS | HEIGHT: 68 IN

## 2025-02-11 DIAGNOSIS — Z86.0101 PERSONAL HISTORY OF ADENOMATOUS AND SERRATED COLON POLYPS: ICD-10-CM

## 2025-02-11 DIAGNOSIS — K21.9 GERD (GASTROESOPHAGEAL REFLUX DISEASE): ICD-10-CM

## 2025-02-11 DIAGNOSIS — L57.0 ACTINIC KERATOSIS: ICD-10-CM

## 2025-02-11 DIAGNOSIS — D22 MELANOCYTIC NEVI: ICD-10-CM

## 2025-02-11 PROBLEM — 305058001: Status: ACTIVE | Noted: 2025-02-11

## 2025-02-11 PROBLEM — D48.5 NEOPLASM OF UNCERTAIN BEHAVIOR OF SKIN: Status: ACTIVE | Noted: 2025-02-11

## 2025-02-11 PROCEDURE — ? COUNSELING

## 2025-02-11 PROCEDURE — 17003 DESTRUCT PREMALG LES 2-14: CPT

## 2025-02-11 PROCEDURE — 11102 TANGNTL BX SKIN SINGLE LES: CPT

## 2025-02-11 PROCEDURE — ? LIQUID NITROGEN

## 2025-02-11 PROCEDURE — 99213 OFFICE O/P EST LOW 20 MIN: CPT

## 2025-02-11 PROCEDURE — 17000 DESTRUCT PREMALG LESION: CPT | Mod: 59

## 2025-02-11 PROCEDURE — ? BIOPSY BY SHAVE METHOD

## 2025-02-11 RX ORDER — ASPIRIN 81 MG/1
1 TABLET TABLET, CHEWABLE ORAL ONCE A DAY
Status: ACTIVE | COMMUNITY

## 2025-02-11 RX ORDER — LOSARTAN POTASSIUM 25 MG/1
1 TABLET TABLET ORAL ONCE A DAY
Status: ON HOLD | COMMUNITY

## 2025-02-11 RX ORDER — ASCORBIC ACID 1000 MG
1 TABLET TABLET ORAL ONCE A DAY
Status: ON HOLD | COMMUNITY

## 2025-02-11 RX ORDER — SIMVASTATIN 40 MG/1
1 TABLET IN THE EVENING TABLET, FILM COATED ORAL ONCE A DAY
Status: ACTIVE | COMMUNITY

## 2025-02-11 RX ORDER — PANTOPRAZOLE SODIUM 20 MG/1
1 TABLET TABLET, DELAYED RELEASE ORAL ONCE A DAY
Qty: 30 | Refills: 1 | Status: ACTIVE | COMMUNITY
Start: 2025-01-02

## 2025-02-11 RX ORDER — GLUCOSAMINE/CHONDR SU A SOD 750-600 MG
1 CAPSULE TABLET ORAL ONCE A DAY
Status: ON HOLD | COMMUNITY

## 2025-02-11 RX ORDER — TAMSULOSIN HYDROCHLORIDE 0.4 MG/1
1 CAPSULE CAPSULE ORAL ONCE A DAY
Status: ON HOLD | COMMUNITY

## 2025-02-11 RX ORDER — SITAGLIPTIN 100 MG/1
1 TABLET TABLET, FILM COATED ORAL ONCE A DAY
Status: ON HOLD | COMMUNITY

## 2025-02-11 RX ORDER — METFORMIN HYDROCHLORIDE 500 MG/1
1 TABLET WITH A MEAL TABLET, FILM COATED ORAL ONCE A DAY
Status: ACTIVE | COMMUNITY

## 2025-02-11 ASSESSMENT — LOCATION SIMPLE DESCRIPTION DERM
LOCATION SIMPLE: LEFT SCALP
LOCATION SIMPLE: SCALP
LOCATION SIMPLE: POSTERIOR SCALP

## 2025-02-11 ASSESSMENT — LOCATION DETAILED DESCRIPTION DERM
LOCATION DETAILED: LEFT SUPERIOR PARIETAL SCALP
LOCATION DETAILED: POSTERIOR MID-PARIETAL SCALP
LOCATION DETAILED: LEFT MEDIAL FRONTAL SCALP
LOCATION DETAILED: RIGHT SUPERIOR PARIETAL SCALP

## 2025-02-11 ASSESSMENT — LOCATION ZONE DERM: LOCATION ZONE: SCALP

## 2025-02-11 NOTE — HPI-TODAY'S VISIT:
69 year old male presents for follow-up of GERD. Taking 20mg Pantoprazole daily. Currently asymptomatic. Stopped TUMS completely.

## 2025-02-11 NOTE — PROCEDURE: LIQUID NITROGEN
Detail Level: Detailed
Consent: The patient's consent was obtained including but not limited to risks of crusting, scabbing, blistering, scarring, darker or lighter pigmentary change, recurrence, incomplete removal and infection.
Show Aperture Variable?: Yes
Duration Of Freeze Thaw-Cycle (Seconds): 5
Post-Care Instructions: I reviewed with the patient in detail post-care instructions. Patient is to wear sunprotection, and avoid picking at any of the treated lesions. Pt may apply Vaseline to crusted or scabbing areas.
Number Of Freeze-Thaw Cycles: 1 freeze-thaw cycle
Render Post-Care Instructions In Note?: no

## 2025-02-11 NOTE — HPI-OTHER HISTORIES
Previously 1/2025 with MELISSA Chaney: 69 year old male presents for evaluation of GERD. Patient states he was diagnosed with pernicious anemia - which he read was likely due to PPI. He stopped taking his daily Pantoprazole 5-6 months ago. A few weeks later, he began to experience post-prandial distress and heartburn. Symptoms mildly improved with 2 TUMs once a day. He was recently told his Calcium levels were elevated and to follow-up with his endocrinologist about possible parathyroid disease. . Had labs completed recently.  Pt forgot his phone in car and unable to remember results.  Not sure if he had anemia or not. . Previously with Dr. Pa Pt here for evaluation of GERD and abnormal CT scan of abdomen. . Pt is referred by Dr. Brayan Smith and Dr. Trae Hadley for evauation of abnormal thickening seen on CT scan; a copy of the note was sent to the referring doctors. . Pt reports that he had COVID and was hospitalized in 2020 for long time and got better after convalescent plasma. He has prior h/o hiatal hernia seen on CT scan. Has had issues of sinus infection and runny nose in the past.  Previously on 4/29/2021, pt reports the has chronic dry cough. He has been seeing Dr. Hawkins for pulmonary issues. He has reflux with spicy foods. No nausea, trouble swallowing, no hiccups, no diarrhea, no abdominal pain. . Previously on 5/16/2022, pt reports that his reflux is very well controlled on protonix. His Hgb is going down again. He has been donating plasma/platelets (due to having AB for covid). . Previously on 9/20/2023, pt reports that he has been having new onset melena daily for several weeks. Having 2-3 BM per day (new). Having worsening acid reflux. . PMH: sleep apnea, DM, CAD, prostate cancer (in remission), HTN SH: no smoke or etoh. [place]/makes awards Meds; simvastatin, tamsulosin, junevia, protonix 40mg (just started), asprin, mvi FH: No GI malignancy . 9/29/2023 - The esophagus was normal. Findings: - The stomach was normal. - A single less than 1 mm angioectasia without bleeding was found in the first portion of the duodenum. Coagulation forhemostasis of bleeding caused by the procedure using argon plasma was successful.  . 5/2021: Stomach, Biopsy: NO SIGNIFICANT ABNORMALITY. . The examined esophagus was normal. The examined duodenum was normal. : Normal Diffuse moderate inflammation characterized by erosions and erythema was found in the entire examined stomach. Biopsies were taken with a cold forceps for histology. The pathology specimen was placed into Bottle Number 1. A medium amount of food (residue) was found in the entire examined stomach. . Biopsies: normal. . OSH HGB: 11/2021: 14.9, 12/2021 14.1; March 2022:13.3 . 7/2022: - A single angiodysplastic lesion in the duodenum. - Normal examined duodenum. Biopsied. - Erythematous mucosa in the antrum. Biopsied. - Normal esophagus. - If Fe def anemia persists, will need to have APC performed to treat the AVM. . A less than 5 mm polyp was found in the cecum. The polyp was sessile. The polyp was removed with a jumbo cold forceps. Resection and retrieval were complete. The pathology specimen was placed into Bottle Number 3. : Single Polyp A less than 5 mm polyp was found in the hepatic flexure. The polyp was sessile. The polyp was removed with a jumbo cold forceps. Resection and retrieval were complete. The pathology specimen was placed into Bottle Number 4. A less than 5 mm polyp was found in the transverse colon. The polyp was sessile. The polyp was removed with a jumbo cold forceps. Resection and retrieval were complete. The pathology specimen was placed into Bottle Number 5. A 6 mm polyp was found in the recto-sigmoid colon. The polyp was sessile. The polyp was removed with a cold snare. Resection and retrieval were complete. The pathology specimen was placed into Bottle Number 6. The pathology specimen was placed into Bottle Number 6. A 6 mm polyp was found in the recto-sigmoid colon. The polyp was sessile. The polyp was removed with a cold snare. Resection and retrieval were complete. The pathology specimen was placed into Bottle Number 7. . A) Duodenum, Biopsy: NO SIGNIFICANT ABNORMALITY. (B) Stomach, Antrum, Biopsy: CHEMICAL/REACTIVE GASTROPATHY. No Evidence of H. Pylori Organisms or Intestinal Metaplasia. Negative for Dysplasia or Malignancy. (C) Cecum, Polypectomy: BENIGN MUCOSAL POLYP(S). See Comment. Negative for Dysplasia or Malignancy. COMMENT: We use "benign mucosal polyp" to refer to an endoscopically polypoid lesion that shows no dysplastic or hyperplastic epithelium or classic features of hamartomatous polyp. (D) Colon, Hepatic Flexure, Polypectomy: TUBULAR ADENOMA(S). (E) Colon, Transverse, Polypectomy: HYPERPLASTIC POLYP(S). (F) Colon, Descending, Polypectomy: HYPERPLASTIC POLYP(S). (G) Colon, Recto-Sigmoid, Polypectomy: HYPERPLASTIC POLYP(S).  - Repeat colonoscopy in 3 years for surveillance.

## 2025-02-11 NOTE — PROCEDURE: BIOPSY BY SHAVE METHOD
Detail Level: Detailed
Depth Of Biopsy: dermis
Was A Bandage Applied: Yes
Size Of Lesion In Cm: 0
Biopsy Type: H and E
Biopsy Method: Dermablade
Anesthesia Type: 1% lidocaine with epinephrine
Anesthesia Volume In Cc: 0.5
Hemostasis: Drysol
Wound Care: Petrolatum
Dressing: bandage
Destruction After The Procedure: No
Type Of Destruction Used: Curettage
Curettage Text: The wound bed was treated with curettage after the biopsy was performed.
Cryotherapy Text: The wound bed was treated with cryotherapy after the biopsy was performed.
Electrodesiccation Text: The wound bed was treated with electrodesiccation after the biopsy was performed.
Electrodesiccation And Curettage Text: The wound bed was treated with electrodesiccation and curettage after the biopsy was performed.
Silver Nitrate Text: The wound bed was treated with silver nitrate after the biopsy was performed.
Lab: [id number]
Lab Facility: [id number]
Medical Necessity Information: It is in your best interest to select a reason for this procedure from the list below. All of these items fulfill various CMS LCD requirements except the new and changing color options.
Consent: Written consent was obtained and risks were reviewed including but not limited to scarring, infection, bleeding, scabbing, incomplete removal, nerve damage and allergy to anesthesia.
Post-Care Instructions: I reviewed with the patient in detail post-care instructions. Patient is to keep the biopsy site dry overnight, and then apply bacitracin twice daily until healed. Patient may apply hydrogen peroxide soaks to remove any crusting.
Notification Instructions: Patient will be notified of biopsy results. However, patient instructed to call the office if not contacted within 2 weeks.
Billing Type: Third-Party Bill
Information: Selecting Yes will display possible errors in your note based on the variables you have selected. This validation is only offered as a suggestion for you. PLEASE NOTE THAT THE VALIDATION TEXT WILL BE REMOVED WHEN YOU FINALIZE YOUR NOTE. IF YOU WANT TO FAX A PRELIMINARY NOTE YOU WILL NEED TO TOGGLE THIS TO 'NO' IF YOU DO NOT WANT IT IN YOUR FAXED NOTE.

## 2025-02-12 ENCOUNTER — APPOINTMENT (OUTPATIENT)
Dept: URBAN - METROPOLITAN AREA OTHER 5 | Facility: OTHER | Age: 70
Setting detail: DERMATOLOGY
End: 2025-02-12

## 2025-02-12 DIAGNOSIS — Z48.817 ENCOUNTER FOR SURGICAL AFTERCARE FOLLOWING SURGERY ON THE SKIN AND SUBCUTANEOUS TISSUE: ICD-10-CM

## 2025-02-12 PROCEDURE — ? POST-OP WOUND CHECK

## 2025-02-12 PROCEDURE — 99024 POSTOP FOLLOW-UP VISIT: CPT

## 2025-02-12 ASSESSMENT — LOCATION DETAILED DESCRIPTION DERM: LOCATION DETAILED: POSTERIOR MID-PARIETAL SCALP

## 2025-02-12 ASSESSMENT — LOCATION SIMPLE DESCRIPTION DERM: LOCATION SIMPLE: POSTERIOR SCALP

## 2025-02-12 ASSESSMENT — LOCATION ZONE DERM: LOCATION ZONE: SCALP

## 2025-02-12 ASSESSMENT — PAIN INTENSITY VAS: HOW INTENSE IS YOUR PAIN 0 BEING NO PAIN, 10 BEING THE MOST SEVERE PAIN POSSIBLE?: NO PAIN

## 2025-02-12 NOTE — PROCEDURE: POST-OP WOUND CHECK
Detail Level: Detailed
Add 71529 Cpt? (Important Note: In 2017 The Use Of 96907 Is Being Tracked By Cms To Determine Future Global Period Reimbursement For Global Periods): yes
Wound Evaluated By: Dr. Cruz
Additional Comments: Pressure dressing and TCA applied today.

## 2025-04-30 ENCOUNTER — OFFICE VISIT (OUTPATIENT)
Dept: URBAN - METROPOLITAN AREA CLINIC 86 | Facility: CLINIC | Age: 70
End: 2025-04-30

## 2025-05-06 ENCOUNTER — TELEPHONE ENCOUNTER (OUTPATIENT)
Dept: URBAN - METROPOLITAN AREA CLINIC 86 | Facility: CLINIC | Age: 70
End: 2025-05-06

## 2025-05-06 NOTE — HPI-TODAY'S VISIT:
Patient is a 69-year-old male being referred in by Dr. Brayan Smith for evaluation of fatty liver.   A copy done at recent referring providers.  Patient has been seen previously by Lexi Quiles PA-C in our group last on February 11, 2025.   Patient was being seen for GERD, dyspepsia, personal history of adenomatous and serrated colon polyps and colon surveillance.   Patient reported at that visit he was taking 20 mg pantoprazole daily was asymptomatic and had stopped using Tums completely.   He had been diagnosed with pernicious anemia which was suspected was possibly due to his PPI had stopped it for a while but then developed worsening symptoms and improved with Tums and then had gone back to these issues.   Also noted to have calcium levels aware of the possibility of parathyroid issues.   Patient had COVID and was hospitalized for therapy of time back in 2020 improved with convalescent plasma.   He has a history of hiatal hernia on CT imaging.   Has previously back in 2021 but noted to have a chronic dry cough and seeing pulmonary for that.   He also has sleep apnea, diabetes, coronary disease, prostate cancer remission, and hypertension.   Patient is taking simvastatin for his lipids.  Takes Januvia for his diabetes to help with risk factor for fatty liver that as well.   EGD September 2023 with a normal esophagus normal stomach and a single 1 mm angiectasia without bleeding seen and treated with argon plasma.   July 2022 EGD also showed single angiodysplastic lesion in duodenum that was noted and a 5 deficiency persisted plan was to treat with argon plasma.   Prior colon with polyp in cecum that was removed.  Also 5 mm polyp in hepatic flexure as well as a 5 mm polyp of transverse colon 6 mm polyp rectosigmoid and another 6 mm polyp in the rectosigmoid.  The transverse colon descending colon and rectosigmoid were hyperplastic but the colon and hepatic flexure was a tubular adenoma.   At last visit also 11,025 was noted to be overweight status at 25.09.   No recent records faxed in from primary I did see labs from April 2022 that showed cholesterol 144 HDL 54 triglycerides elevated 186 and LDL 65 A1c was elevated 6.6 and TSH 1.84 iron saturation was low at 9% and ferritin low at 7   1.  Fatty liver suspected and also reported to have possible advanced fibrosis.  Need to get the records of this and consider MRI/MR elastography to assess this further.  A-fib/ELF score.  Needs to work on optimizing factors that could be contributing to this such as diabetes hyperlipidemia weight and exercise habits as well.  If diabetes continues to be a factor consider GLP-1 agents that can help with same as well.  2.  Advanced hepatic fibrosis needing follow-up of imaging suggested this an MRI/MR elastography to assess this further but this can be monitored.  If stage II-III could be a candidate for new drug therapy on that GLP 1 agent [place] standard therapy and healthy habits will be next options pending emerging therapies.  3.  Diabetes noted and needs to be optimized and on Januvia therapy for same.  Consider GLP-1 agents with option for patient with other issues.  4.  Overweight status noted but minimally so we will monitor course of same.  Stay on healthy habits will be important as well knowing that losing 5 to 10% of the weight can help as well.  5.  Hyperlipidemia noted on treatment with triglycerides noted to be elevated.  Not all lipid therapies help lower that and I will be sent to the look at as well.  Long-term.  6.  Coronary atherosclerosis history noted as per team.  7.  Hypertension history noted on medications.  8.  History of prostate cancer noted and doing well per report.  9.  GERD history noted in follow-up with local GI and apparently on PPI therapy doing better.  10.  Dysplasia previously treated.  11.  Personal history of adenomatous and serrated colon polyps noted and follows with local GI team.  12.  Dyspepsia history noted as above to recently improved after being off of his PPI treatment.       Plan   1.  _update labs.  2.  Check fib 4/ELF.  3.  Check ultrasound imaging and consider FibroScan versus elastography.  4.  Check hepatitis A and B status.  5.  Check hep C level.  6.  Depending above do additional testing accordingly.  7.  Continue to focus on healthy habits such as optimizing diet, exercise and 5 to 10% weight loss.   Duration of visit was 80 minutes with 40 minutes spent prepping chart and loading info for the visit to [facility] records and then 40 additional minutes spent for this face to face visit reviewing chart and events and plans with the pt.

## 2025-05-13 ENCOUNTER — OFFICE VISIT (OUTPATIENT)
Dept: URBAN - METROPOLITAN AREA CLINIC 86 | Facility: CLINIC | Age: 70
End: 2025-05-13

## 2025-05-13 RX ORDER — PANTOPRAZOLE SODIUM 20 MG/1
1 TABLET TABLET, DELAYED RELEASE ORAL ONCE A DAY
Qty: 30 | Refills: 1 | Status: ACTIVE | COMMUNITY
Start: 2025-01-02

## 2025-05-13 RX ORDER — SITAGLIPTIN 100 MG/1
1 TABLET TABLET, FILM COATED ORAL ONCE A DAY
Status: ACTIVE | COMMUNITY

## 2025-05-13 RX ORDER — ASCORBIC ACID 1000 MG
1 TABLET TABLET ORAL ONCE A DAY
Status: ON HOLD | COMMUNITY

## 2025-05-13 RX ORDER — LOSARTAN POTASSIUM 25 MG/1
1 TABLET TABLET ORAL ONCE A DAY
Status: ACTIVE | COMMUNITY

## 2025-05-13 RX ORDER — METFORMIN HYDROCHLORIDE 500 MG/1
1 TABLET WITH A MEAL TABLET, FILM COATED ORAL ONCE A DAY
Status: ACTIVE | COMMUNITY

## 2025-05-13 RX ORDER — SIMVASTATIN 40 MG/1
1 TABLET IN THE EVENING TABLET, FILM COATED ORAL ONCE A DAY
Status: ACTIVE | COMMUNITY

## 2025-05-13 RX ORDER — TAMSULOSIN HYDROCHLORIDE 0.4 MG/1
1 CAPSULE CAPSULE ORAL ONCE A DAY
Status: ACTIVE | COMMUNITY

## 2025-05-13 RX ORDER — ASPIRIN 81 MG/1
1 TABLET TABLET, CHEWABLE ORAL ONCE A DAY
Status: ACTIVE | COMMUNITY

## 2025-05-13 RX ORDER — GLUCOSAMINE/CHONDR SU A SOD 750-600 MG
1 CAPSULE TABLET ORAL ONCE A DAY
Status: ON HOLD | COMMUNITY

## 2025-05-14 ENCOUNTER — LAB OUTSIDE AN ENCOUNTER (OUTPATIENT)
Dept: URBAN - METROPOLITAN AREA CLINIC 98 | Facility: CLINIC | Age: 70
End: 2025-05-14

## 2025-05-14 ENCOUNTER — LAB OUTSIDE AN ENCOUNTER (OUTPATIENT)
Dept: URBAN - METROPOLITAN AREA CLINIC 86 | Facility: CLINIC | Age: 70
End: 2025-05-14

## 2025-05-14 ENCOUNTER — WEB ENCOUNTER (OUTPATIENT)
Dept: URBAN - METROPOLITAN AREA CLINIC 98 | Facility: CLINIC | Age: 70
End: 2025-05-14

## 2025-05-14 ENCOUNTER — OFFICE VISIT (OUTPATIENT)
Dept: URBAN - METROPOLITAN AREA CLINIC 86 | Facility: CLINIC | Age: 70
End: 2025-05-14
Payer: COMMERCIAL

## 2025-05-14 DIAGNOSIS — K74.02 ADVANCED HEPATIC FIBROSIS: ICD-10-CM

## 2025-05-14 DIAGNOSIS — K21.9 GERD (GASTROESOPHAGEAL REFLUX DISEASE): ICD-10-CM

## 2025-05-14 DIAGNOSIS — D50.8 OTHER IRON DEFICIENCY ANEMIA: ICD-10-CM

## 2025-05-14 DIAGNOSIS — E78.5 HYPERLIPIDEMIA: ICD-10-CM

## 2025-05-14 DIAGNOSIS — E66.3 OVERWEIGHT: ICD-10-CM

## 2025-05-14 DIAGNOSIS — Z86.0101 PERSONAL HISTORY OF ADENOMATOUS AND SERRATED COLON POLYPS: ICD-10-CM

## 2025-05-14 DIAGNOSIS — K55.20 ANGIODYSPLASIA: ICD-10-CM

## 2025-05-14 DIAGNOSIS — I10 HTN: ICD-10-CM

## 2025-05-14 DIAGNOSIS — E11.9 DIABETES: ICD-10-CM

## 2025-05-14 DIAGNOSIS — I25.10 CORONARY ARTERIOSCLEROSIS: ICD-10-CM

## 2025-05-14 DIAGNOSIS — Z85.46 HISTORY OF PROSTATE CANCER: ICD-10-CM

## 2025-05-14 DIAGNOSIS — K76.0 FATTY LIVER: ICD-10-CM

## 2025-05-14 PROCEDURE — 99215 OFFICE O/P EST HI 40 MIN: CPT

## 2025-05-14 RX ORDER — ALOGLIPTIN 25 MG/1
1 TABLET TABLET, FILM COATED ORAL ONCE A DAY
Status: ACTIVE | COMMUNITY

## 2025-05-14 RX ORDER — METFORMIN HYDROCHLORIDE 500 MG/1
1 TABLET WITH A MEAL TABLET, FILM COATED ORAL ONCE A DAY
Status: ACTIVE | COMMUNITY

## 2025-05-14 RX ORDER — LOSARTAN POTASSIUM 25 MG/1
1 TABLET TABLET ORAL ONCE A DAY
Status: DISCONTINUED | COMMUNITY

## 2025-05-14 RX ORDER — ASCORBIC ACID 1000 MG
1 TABLET TABLET ORAL ONCE A DAY
Status: DISCONTINUED | COMMUNITY

## 2025-05-14 RX ORDER — GLUCOSAMINE/CHONDR SU A SOD 750-600 MG
1 CAPSULE TABLET ORAL ONCE A DAY
Status: DISCONTINUED | COMMUNITY

## 2025-05-14 RX ORDER — TAMSULOSIN HYDROCHLORIDE 0.4 MG/1
1 CAPSULE CAPSULE ORAL ONCE A DAY
Status: ACTIVE | COMMUNITY

## 2025-05-14 RX ORDER — ASPIRIN 81 MG/1
1 TABLET TABLET, CHEWABLE ORAL ONCE A DAY
Status: ACTIVE | COMMUNITY

## 2025-05-14 RX ORDER — PANTOPRAZOLE SODIUM 20 MG/1
1 TABLET TABLET, DELAYED RELEASE ORAL ONCE A DAY
Qty: 30 | Refills: 1 | Status: ACTIVE | COMMUNITY
Start: 2025-01-02

## 2025-05-14 RX ORDER — SITAGLIPTIN 100 MG/1
1 TABLET TABLET, FILM COATED ORAL ONCE A DAY
Status: DISCONTINUED | COMMUNITY

## 2025-05-14 RX ORDER — SIMVASTATIN 40 MG/1
1 TABLET IN THE EVENING TABLET, FILM COATED ORAL ONCE A DAY
Status: ACTIVE | COMMUNITY

## 2025-05-14 NOTE — HPI-TODAY'S VISIT:
Patient is a 69-year-old male being referred in by Dr. Brayan Smith for evaluation of fatty liver and fibrosis issues.    A copy done at recent referring providers.    March 2025 labs wbc 7.7 and hg 15.9and hct 48.7 and mcv 92 and platelets 187 and neutrophils 5.6 and lymphs 1.3 and ashok 170 and bun 12 and cr 1.26 and na 143 and k 4.7 and cl 101 and co2 26 and ca 9.7 and alb 4.8 and tb 0.3 and alk 75 and ast 21 and alt 29.  vit b12 1310.  Dec 2024 alk 70 and ast 25 and alt 40. wbc 67 hg 16.7 and plat 175 and glu 192 and cr 1.24 and na 142 and k 4.6 and cl 99 and co2 26 and ca 10.4 and alb 5.0 and tb 0.5  alk 70 and ast 25 and alt 40 .  Nov 27 alk 72 and ast 24 and alt 34.  oct 2024 glu 150 and alb 4.7alk 78 and ast 25 and alt 42.   he did nside imaging 2024 and was told liver was fatty and not remember if was told scarring.  AGA note Patient has been seen previously by Lexi Quiles PA-C in our group last on February 11, 2025.   Patient was being seen for GERD, dyspepsia, personal history of adenomatous and serrated colon polyps and colon surveillance.   Patient reported at that visit he was taking 20 mg pantoprazole daily was asymptomatic and had stopped using Tums completely.   He had been diagnosed with pernicious anemia which was suspected was possibly due to his PPI had stopped it for a while but then developed worsening symptoms and improved with Tums and then had gone back to these issues.   Also noted to have calcium levels aware of the possibility of parathyroid issues.  Feb 2025 had flu and was ill and coughing .  Colon Dr Morejon soon and wants to look at egd also.    Patient had COVID and was hospitalized for therapy of time back in 2020 improved with convalescent plasma. he was one of the first recipients. On vent for 18 days.    He has a history of hiatal hernia on CT imaging.    Has previously back in 2021 but noted to have a chronic dry cough and seeing pulmonary for that.  He also has sleep apnea, diabetes, coronary disease, prostate cancer remission, and hypertension.   Patient is taking simvastatin for his lipids.  Takes Januvia for his diabetes to help with risk factor for fatty liver that as well.    EGD September 2023 with a normal esophagus normal stomach and a single 1 mm angiectasia without bleeding seen and treated with argon plasma.   July 2022 EGD also showed single angiodysplastic lesion in duodenum that was noted and a 5 deficiency persisted  plan was to treat with argon plasma.  Prior colon with polyp in cecum that was removed.  Also 5 mm polyp in hepatic flexure as well as a 5 mm polyp of transverse colon 6 mm polyp rectosigmoid and another 6 mm polyp in the rectosigmoid.  The transverse colon descending colon and rectosigmoid were hyperplastic but the colon and hepatic flexure was a tubular adenoma.    At last visit also feb 11,025 was noted to be overweight status at 25.09.  His a1 was 7% and via healow in April 2022 that showed cholesterol 144 HDL 54 triglycerides elevated 186 and LDL 65 A1c was elevated 6.6 and TSH 1.84 iron saturation was low at 9% and ferritin low at 7  Never been ozempic and mounjaro.    Plan   1.  Need to get fib 4/elf update labs.  2.  Important to fatty to optimize dm and lipids and any other risk factors as well low carb diet and exercise like walking 30 min a day and some weight 5-10%. 3.  Check ultrasound imaging and consider FibroScan pending this.   4.  Check hepatitis A and B status.  5.  Check hep C level.  6.  Depending above do additional testing accordingly.  7. Do telemed to review when done.    Duration of visit was 80 minutes with 40 minutes spent prepping chart and loading info for the visit to [facility] records and then 40 additional minutes spent for this face to face visit reviewing chart and events and plans with the pt.

## 2025-05-14 NOTE — EXAM-PHYSICAL EXAM
Gen: awake and responsive.   Eyes: anicteric, normal lids.   Mouth: normal lips.   Nose: no drainage.   Hearing: intact grossly.   Neck: trachea midline and no jvd.   CV: RRR no s3.   Lungs: clear. No wheezes,   Abd: Soft, nabs, NR, NT. No appreciable liver or spleen enlargement.   Ext: min leg edema, no palm erythema.   Neuro: moves all 4 ext grossly.

## 2025-05-15 ENCOUNTER — TELEPHONE ENCOUNTER (OUTPATIENT)
Dept: URBAN - METROPOLITAN AREA CLINIC 86 | Facility: CLINIC | Age: 70
End: 2025-05-15

## 2025-05-15 LAB
BUN/CREATININE RATIO: 12
BUN: 13
CARBON DIOXIDE, TOTAL: 25
CHLORIDE: 99
CREATININE: 1.12
EGFR: 70
GLUCOSE: 207
HEP A AB, TOTAL: NEGATIVE
HEP B CORE AB, TOT: NEGATIVE
HEPATITIS B SURF AB QUANT: <3.5
POTASSIUM: 4.6
SODIUM: 140

## 2025-05-15 NOTE — HPI-TODAY'S VISIT:
Dear Daniel Arevalo,   May 14 labs show glucose elevated at 207 but unclear if you were fasting that day or not?  Please share with local provider.   BUN of 13 creatinine 1.12 sodium 140 potassium 4.6 chloride 99 CO2 25 with these were normal's.   You are not immune to hepatitis B with a surface antibody level of less than 3.5 mIU/mL.  You should consider getting the hep B vaccine series electively.   You are not immune to hepatitis A either and you should consider doing the combined hep B and hep A vaccine series which is known as the Twinrix which is 3 doses over 6 months (o,1, and 6m).  You can do this locally with the primary provider, or we can order it at a local [facility] office, or you can also do it at your local pharmacy.   Hep B core total was confirmed to be negative and that was important to note.   I do see a few tests that are still pending which include the hepatic function panel with reflex to ELF score as well as the hep C reflex test.   So far from what we are seeing, remains important to work on the diabetes as a very important target for you to continue to look at for liver health goals.   Will notify you when we get the missing results. Thank you.    Dr. Young

## 2025-05-29 ENCOUNTER — LAB OUTSIDE AN ENCOUNTER (OUTPATIENT)
Dept: URBAN - METROPOLITAN AREA CLINIC 6 | Facility: CLINIC | Age: 70
End: 2025-05-29

## 2025-05-29 ENCOUNTER — TELEPHONE ENCOUNTER (OUTPATIENT)
Dept: URBAN - METROPOLITAN AREA CLINIC 6 | Facility: CLINIC | Age: 70
End: 2025-05-29

## 2025-06-10 ENCOUNTER — TELEPHONE ENCOUNTER (OUTPATIENT)
Dept: URBAN - METROPOLITAN AREA CLINIC 86 | Facility: CLINIC | Age: 70
End: 2025-06-10

## 2025-06-10 NOTE — HPI-TODAY'S VISIT:
Dear Daniel Arevalo,    June 7 u.s:   Pancreas with somewhat lobulated head and body of pancreas. Tail obscured by bowel gas.   Aorta is normal.   Visualized aspects of inferior vena cava unremarkable.   Liver normal size and contour and echogenicity. No solid mass seen. No ascites   Liver 16cm. Portal and hepatic veins with normal directional flow.   Gallbladder is normal. Common bile duct 3mm.   Right kidney 11.9cm and left kidney 11.6cm and no hydronephrosis.   Spleen 10.8cm.    Overall, they mention that the liver appeared to be fatty and pancreas was somewhat lobulated. Can discuss doing an mri for better look at both next visit.    Dr Young

## 2025-07-03 ENCOUNTER — TELEPHONE ENCOUNTER (OUTPATIENT)
Dept: URBAN - METROPOLITAN AREA CLINIC 96 | Facility: CLINIC | Age: 70
End: 2025-07-03

## 2025-07-04 ENCOUNTER — WEB ENCOUNTER (OUTPATIENT)
Dept: URBAN - METROPOLITAN AREA CLINIC 98 | Facility: CLINIC | Age: 70
End: 2025-07-04

## 2025-07-09 ENCOUNTER — WEB ENCOUNTER (OUTPATIENT)
Dept: URBAN - METROPOLITAN AREA CLINIC 96 | Facility: CLINIC | Age: 70
End: 2025-07-09

## 2025-07-10 ENCOUNTER — OFFICE VISIT (OUTPATIENT)
Dept: URBAN - METROPOLITAN AREA SURGERY CENTER 18 | Facility: SURGERY CENTER | Age: 70
End: 2025-07-10

## 2025-07-11 ENCOUNTER — OFFICE VISIT (OUTPATIENT)
Dept: URBAN - METROPOLITAN AREA TELEHEALTH 2 | Facility: TELEHEALTH | Age: 70
End: 2025-07-11
Payer: COMMERCIAL

## 2025-07-11 ENCOUNTER — LAB OUTSIDE AN ENCOUNTER (OUTPATIENT)
Dept: URBAN - METROPOLITAN AREA TELEHEALTH 2 | Facility: TELEHEALTH | Age: 70
End: 2025-07-11

## 2025-07-11 DIAGNOSIS — Z86.0101 PERSONAL HISTORY OF ADENOMATOUS AND SERRATED COLON POLYPS: ICD-10-CM

## 2025-07-11 DIAGNOSIS — K76.0 FATTY LIVER: ICD-10-CM

## 2025-07-11 DIAGNOSIS — I10 HTN: ICD-10-CM

## 2025-07-11 DIAGNOSIS — E11.9 DIABETES: ICD-10-CM

## 2025-07-11 DIAGNOSIS — K21.9 GERD (GASTROESOPHAGEAL REFLUX DISEASE): ICD-10-CM

## 2025-07-11 DIAGNOSIS — E66.3 OVERWEIGHT: ICD-10-CM

## 2025-07-11 DIAGNOSIS — E78.5 HYPERLIPIDEMIA: ICD-10-CM

## 2025-07-11 DIAGNOSIS — K74.02 ADVANCED HEPATIC FIBROSIS: ICD-10-CM

## 2025-07-11 DIAGNOSIS — Z71.89 VACCINE COUNSELING: ICD-10-CM

## 2025-07-11 DIAGNOSIS — D50.8 OTHER IRON DEFICIENCY ANEMIA: ICD-10-CM

## 2025-07-11 DIAGNOSIS — Z85.46 HISTORY OF PROSTATE CANCER: ICD-10-CM

## 2025-07-11 DIAGNOSIS — I25.10 CORONARY ARTERIOSCLEROSIS: ICD-10-CM

## 2025-07-11 DIAGNOSIS — K55.20 ANGIODYSPLASIA: ICD-10-CM

## 2025-07-11 PROCEDURE — 99215 OFFICE O/P EST HI 40 MIN: CPT

## 2025-07-11 RX ORDER — PANTOPRAZOLE SODIUM 20 MG/1
1 TABLET TABLET, DELAYED RELEASE ORAL ONCE A DAY
Qty: 30 | Refills: 1 | Status: ACTIVE | COMMUNITY
Start: 2025-01-02

## 2025-07-11 RX ORDER — SIMVASTATIN 40 MG/1
1 TABLET IN THE EVENING TABLET, FILM COATED ORAL ONCE A DAY
Status: ACTIVE | COMMUNITY

## 2025-07-11 RX ORDER — ALOGLIPTIN 25 MG/1
1 TABLET TABLET, FILM COATED ORAL ONCE A DAY
Status: ACTIVE | COMMUNITY

## 2025-07-11 RX ORDER — METFORMIN HYDROCHLORIDE 500 MG/1
1 TABLET WITH A MEAL TABLET, FILM COATED ORAL ONCE A DAY
Status: ACTIVE | COMMUNITY

## 2025-07-11 RX ORDER — ASPIRIN 81 MG/1
1 TABLET TABLET, CHEWABLE ORAL ONCE A DAY
Status: ACTIVE | COMMUNITY

## 2025-07-11 RX ORDER — TAMSULOSIN HYDROCHLORIDE 0.4 MG/1
1 CAPSULE CAPSULE ORAL ONCE A DAY
Status: ACTIVE | COMMUNITY

## 2025-07-11 NOTE — HPI-TODAY'S VISIT:
Patient is a 69-year-old male being referred in May 2025 by Dr. Brayan Smith for evaluation of fatty liver and fibrosis issues.    A copy done at recent referring providers.    June 7 u.s: Pancreas with somewhat lobulated head and body of pancreas. Tail obscured by bowel gas. Aorta is normal. Visualized aspects of inferior vena cava unremarkable. Liver normal size and contour and echogenicity. No solid mass seen. No ascites Liver 16cm. Portal and hepatic veins with normal directional flow. Gallbladder is normal. Common bile duct 3mm. Right kidney 11.9cm and left kidney 11.6cm and no hydronephrosis. Spleen 10.8cm. Overall, they mention that the liver appeared to be fatty and pancreas was somewhat lobulated. Can discuss doing an mri for better look at both next visit.  May 14 labs show glucose elevated at 207 but unclear if you were fasting that day or not?  Please share with local provider. BUN of 13 creatinine 1.12 sodium 140 potassium 4.6 chloride 99 CO2 25 with these were normal's. You are not immune to hepatitis B with a surface antibody level of less than 3.5 mIU/mL.  You should consider getting the hep B vaccine series electively. You are not immune to hepatitis A either and you should consider doing the combined hep B and hep A vaccine series which is known as the Twinrix which is 3 doses over 6 months (0,1, and 6m).  You can do this locally with the primary provider, or we can order it at a local [facility] [place] office, or you can also do it at your local pharmacy. Hep B core total was confirmed to be negative and that was important to note. I do see a few tests that are still pending which include the hepatic function panel with reflex to ELF score as well as the hep C reflex test.  March 2025 labs wbc 7.7 and hg 15.9and hct 48.7 and mcv 92 and platelets 187 and neutrophils 5.6 and lymphs 1.3 and ashok 170 and bun 12 and cr 1.26 and na 143 and k 4.7 and cl 101 and co2 26 and ca 9.7 and alb 4.8 and tb 0.3 and alk 75 and ast 21 and alt 29. vit b12 1310.  Dec 2024 alk 70 and ast 25 and alt 40. wbc 67 hg 16.7 and plat 175 and glu 192 and cr 1.24 and na 142 and k 4.6 and cl 99 and co2 26 and ca 10.4 and alb 5.0 and tb 0.5  alk 70 and ast 25 and alt 40 .  Nov 27 alk 72 and ast 24 and alt 34. oct 2024 glu 150 and alb 4.7alk 78 and ast 25 and alt 42.   Nside imaging 2024 and was told liver was fatty and not remember if was told scarring.  AGA note Patient has been seen previously by Lexi Quiles PA-C in our group last on February 11, 2025.   Patient was being seen for GERD, dyspepsia, personal history of adenomatous and serrated colon polyps and colon surveillance.   Patient reported at that visit he was taking 20 mg pantoprazole daily was asymptomatic and had stopped using Tums completely.   He had been diagnosed with pernicious anemia which was suspected was possibly due to his PPI had stopped it for a while but then developed worsening symptoms and improved with Tums and then had gone back to these issues.   Also noted to have calcium levels aware of the possibility of parathyroid issues.  Feb 2025 had flu and was ill and coughing .  Colon Dr Morejon soon and wants to look at egd also.    Patient had COVID and was hospitalized for therapy of time back in 2020 improved with convalescent plasma. he was one of the first recipients. On vent for 18 days.    He has a history of hiatal hernia on CT imaging.    Has previously back in 2021 but noted to have a chronic dry cough and seeing pulmonary for that.  He also has sleep apnea, diabetes, coronary disease, prostate cancer remission, and hypertension.   Patient is taking simvastatin for his lipids.  Takes Januvia for his diabetes to help with risk factor for fatty liver that as well.    EGD September 2023 with a normal esophagus normal stomach and a single 1 mm angiectasia without bleeding seen and treated with argon plasma.   July 2022 EGD also showed single angiodysplastic lesion in duodenum that was noted and a 5 deficiency persisted  plan was to treat with argon plasma.  Prior colon with polyp in cecum that was removed.  Also 5 mm polyp in hepatic flexure as well as a 5 mm polyp of transverse colon 6 mm polyp rectosigmoid and another 6 mm polyp in the rectosigmoid.  The transverse colon descending colon and rectosigmoid were hyperplastic but the colon and hepatic flexure was a tubular adenoma.    At last visit also feb 11,025 was noted to be overweight status at 25.09.  His a1 was 7% and via healow in April 2022 that showed cholesterol 144 HDL 54 triglycerides elevated 186 and LDL 65 A1c was elevated 6.6 and TSH 1.84 iron saturation was low at 9% and ferritin low at 7  Never been ozempic and mounjaro.    Plan   1.  Fibvroscan to check fat and fibrosis. 2.  Pt will do labs to assess. 3.  Put in an order twinrx vaccine. 4.  Pt will Pt will stay healthy habits.    Duration of visit was 46  minutes with 20 minutes spent prepping chart and loading info for the visit to [facility] records and then 26 additional minutes [facility] reviewing chart and events and plans with the pt.

## 2025-07-21 ENCOUNTER — OFFICE VISIT (OUTPATIENT)
Dept: URBAN - METROPOLITAN AREA CLINIC 86 | Facility: CLINIC | Age: 70
End: 2025-07-21

## 2025-07-23 ENCOUNTER — WEB ENCOUNTER (OUTPATIENT)
Dept: URBAN - METROPOLITAN AREA CLINIC 86 | Facility: CLINIC | Age: 70
End: 2025-07-23

## 2025-07-24 ENCOUNTER — LAB OUTSIDE AN ENCOUNTER (OUTPATIENT)
Dept: URBAN - METROPOLITAN AREA CLINIC 86 | Facility: CLINIC | Age: 70
End: 2025-07-24

## 2025-07-30 ENCOUNTER — TELEPHONE ENCOUNTER (OUTPATIENT)
Dept: URBAN - METROPOLITAN AREA CLINIC 86 | Facility: CLINIC | Age: 70
End: 2025-07-30

## 2025-07-30 LAB
ALBUMIN: 4.6
ALKALINE PHOSPHATASE: 85
ALPHA 2-MACROGLOBULINS, QN: 228
ALT (SGPT) P5P: 50
ALT (SGPT): 48
APOLIPOPROTEIN A-1: 155
AST (SGOT) P5P: 37
AST (SGOT): 32
BILIRUBIN, DIRECT: 0.12
BILIRUBIN, TOTAL: 0.3
BILIRUBIN, TOTAL: <0.1
CHOLESTEROL, TOTAL: 155
COMMENT:: (no result)
FIBROSIS SCORE: 0.16
FIBROSIS SCORING:: (no result)
FIBROSIS STAGE: (no result)
GGT: 37
GLUCOSE, SERUM: 169
HAPTOGLOBIN: 144
HBSAG SCREEN: NEGATIVE
HEP A AB, TOTAL: NEGATIVE
HEP B CORE AB, TOT: NEGATIVE
HEP B SURFACE AB, QUAL: NON REACTIVE
INTERPRETATION: (no result)
INTERPRETATIONS:: (no result)
LIMITATIONS:: (no result)
Lab: (no result)
NASH GRADE: (no result)
NASH SCORE: 0.69
NASH SCORING: (no result)
PROTEIN, TOTAL: 6.9
STEATOSIS GRADE: (no result)
STEATOSIS SCORE: 0.77
STEATOSIS SCORING: (no result)
TRIGLYCERIDES: 237

## 2025-07-30 NOTE — HPI-TODAY'S VISIT:
Dear Daniel Arevalo,    July 24 labs showed that your total protein was 6.9 albumin 4.6 bilirubin 0.3 indirect 0.12 alk phos 85 and AST normal at 32 but your ALT was elevated at 48 with ideal ALT for man being less than 35.    Viral hepatitis screen showed hep B surface antigen negative, hep B core total negative indicating no active disease and no prior exposure.  You have no immunity to hepatitis A or to hepatitis B present and you should consider getting vaccine series that contains hep A/hep B so you can acquire immunity to both over series of 3 doses and 6 months of time. Please discuss with primary and we will also discuss more at visit.    Your fibrosis score by the Serrano FibroSure plus with reflex to ELF showed a fibrosis score that was low at 0.16 which would suggest that you are at F0/no fibrosis.    Your steatosis score was elevated at 0.77 which puts you at the S2/S3 moderate to severe steatosis estimate by that assay and your Serrano score was elevated at 0.69 which would put you at the end to moderate SERRANO risk.  This serologic assay was based upon a bilirubin that was normal at less than 0.1 Gamma GTP normal at 37 AST of 37 ALT 50 cholesterol of 155 glucose elevated at 169 and triglycerides of 237.    We need to get that FibroScan that you did at [facility] or elsewhere sent in to compare to this.  I have asked Katelin to work on that after receiving your updated message as I did not see that it was in your chart yet.     Our plans from this testing would be to confirm your FibroScan status as the second imaging modality as to degree of fat and fibrosis and once we have that that we can set up a visit to review with you your current status and what standard therapy options you have available again and are you a candidate for any of the other med options for more advanced disease.     Dr. Young

## 2025-07-31 ENCOUNTER — TELEPHONE ENCOUNTER (OUTPATIENT)
Dept: URBAN - METROPOLITAN AREA CLINIC 86 | Facility: CLINIC | Age: 70
End: 2025-07-31

## 2025-07-31 NOTE — HPI-TODAY'S VISIT:
Dear Katelin Xavier was able to get at the mystery of why they could not find your FibroScan. It appears that you have 2 accounts with [facility] and they need to merge these accounts into 1 account. The FibroScan was ordered by Lexi Quiles PA-C.    We were noting that this was done on June 7, 2025 and it showed that your controlled attenuation parameter score was elevated at 390 dB/m which is in the severe steatosis range.  Values over 290 by the old scale are considered to be in that range and in the new updated guidelines they are proposing that values over 288 dB/m be considered in the moderate to severe category.    The second component on the test was your elastography/stiffness estimate which had an IQR or variance of 9% and a median value of 4.7 kPa which would put you in the F0/F1 level of fibrosis using the nonalcoholic fatty liver disease scale.    Your range on the fibrosis scale was from 3.9 up to 5.3 which would all still be in the F0/F1 range as well.    As you recall you had the recent noninvasive Dexter FibroSure test result come back that said that your fibrosis score was low at 0.16 which would also put you at the F2 0/no fibrosis scale so that was reinforced with this result.   That same score also said that your steatosis/fatty liver score was 0.77 and that would put you at the S2/S3 moderate to severe steatosis category which is also correct here.    Rezdiffra which is the fatty liver medicine is indicated only for stage II-III level disease at this timeframe and you would not meet criteria for that.    Per my notes you are diabetic and you were previously started on Januvia and very important for you to work with your doctors and see what can be done to continue to optimize that issue and to see if you will be a candidate for one of the GLP-1 agents such as Ozempic or Mounjaro which have shown additional benefits in helping people with fatty liver issues as well.  Please share information with them.    Treating and optimizing your lipids is also important and you are on simvastatin therapy for that as well.   Optimizing your weight as well and losing that 5 to 10% will be important steps to continue as well.    Maintaining your healthy low-carb/low-fat diet within your ADA restrictions such as a modified Mediterranean diet would be important to explore and having 150 to 200 minutes of moderate exercise a week also could be a part of the healthy habits to continue to reinforce for you.    Now that we have this information we can plan for that follow-up visit and it can be done as a TeleMed to review those issues.    Please contact Katelin when you have a chance to look at your schedule so we can plan out that follow-up visit and discuss any further updates.  If you want you can time it after you have follow-up with your primary care team with Dr. Smith and discuss those issues and we can review plans and time next follow up from there.  Thank you.   Dr. Young.

## 2025-08-05 ENCOUNTER — TELEPHONE ENCOUNTER (OUTPATIENT)
Dept: URBAN - METROPOLITAN AREA CLINIC 86 | Facility: CLINIC | Age: 70
End: 2025-08-05

## 2025-08-11 ENCOUNTER — TELEPHONE ENCOUNTER (OUTPATIENT)
Dept: URBAN - METROPOLITAN AREA CLINIC 96 | Facility: CLINIC | Age: 70
End: 2025-08-11

## 2025-08-11 ENCOUNTER — CLAIMS CREATED FROM THE CLAIM WINDOW (OUTPATIENT)
Dept: URBAN - METROPOLITAN AREA CLINIC 4 | Facility: CLINIC | Age: 70
End: 2025-08-11
Payer: COMMERCIAL

## 2025-08-11 ENCOUNTER — CLAIMS CREATED FROM THE CLAIM WINDOW (OUTPATIENT)
Dept: URBAN - METROPOLITAN AREA SURGERY CENTER 18 | Facility: SURGERY CENTER | Age: 70
End: 2025-08-11
Payer: COMMERCIAL

## 2025-08-11 DIAGNOSIS — K29.80 DUODENITIS WITHOUT BLEEDING: ICD-10-CM

## 2025-08-11 DIAGNOSIS — K29.80 DUODENITIS: ICD-10-CM

## 2025-08-11 DIAGNOSIS — D12.3 BENIGN NEOPLASM OF TRANSVERSE COLON: ICD-10-CM

## 2025-08-11 DIAGNOSIS — Z86.0100 PERSONAL HISTORY OF COLON POLYPS, UNSPECIFIED: ICD-10-CM

## 2025-08-11 DIAGNOSIS — K63.5 SESSILE COLONIC POLYP: ICD-10-CM

## 2025-08-11 DIAGNOSIS — Z12.11 SCREENING FOR COLON CANCER: ICD-10-CM

## 2025-08-11 DIAGNOSIS — K62.1 RECTAL POLYP: ICD-10-CM

## 2025-08-11 DIAGNOSIS — K31.89 OTHER DISEASES OF STOMACH AND DUODENUM: ICD-10-CM

## 2025-08-11 PROCEDURE — 43239 EGD BIOPSY SINGLE/MULTIPLE: CPT | Performed by: INTERNAL MEDICINE

## 2025-08-11 PROCEDURE — 00813 ANES UPR LWR GI NDSC PX: CPT | Performed by: NURSE ANESTHETIST, CERTIFIED REGISTERED

## 2025-08-11 PROCEDURE — 45380 COLONOSCOPY AND BIOPSY: CPT | Performed by: INTERNAL MEDICINE

## 2025-08-11 PROCEDURE — 88342 IMHCHEM/IMCYTCHM 1ST ANTB: CPT | Performed by: PATHOLOGY

## 2025-08-11 PROCEDURE — 88305 TISSUE EXAM BY PATHOLOGIST: CPT | Performed by: PATHOLOGY

## 2025-08-11 PROCEDURE — 0529F INTRVL 3/>YR PTS CLNSCP DOCD: CPT | Performed by: INTERNAL MEDICINE

## 2025-08-11 PROCEDURE — 45385 COLONOSCOPY W/LESION REMOVAL: CPT | Performed by: INTERNAL MEDICINE

## 2025-08-11 RX ORDER — METFORMIN HYDROCHLORIDE 500 MG/1
1 TABLET WITH A MEAL TABLET, FILM COATED ORAL ONCE A DAY
Status: ACTIVE | COMMUNITY

## 2025-08-11 RX ORDER — ASPIRIN 81 MG/1
1 TABLET TABLET, CHEWABLE ORAL ONCE A DAY
Status: ACTIVE | COMMUNITY

## 2025-08-11 RX ORDER — PANTOPRAZOLE SODIUM 20 MG/1
1 TABLET TABLET, DELAYED RELEASE ORAL ONCE A DAY
Qty: 30 | Refills: 1 | Status: ACTIVE | COMMUNITY
Start: 2025-01-02

## 2025-08-11 RX ORDER — ALOGLIPTIN 25 MG/1
1 TABLET TABLET, FILM COATED ORAL ONCE A DAY
Status: ACTIVE | COMMUNITY

## 2025-08-11 RX ORDER — TAMSULOSIN HYDROCHLORIDE 0.4 MG/1
1 CAPSULE CAPSULE ORAL ONCE A DAY
Status: ACTIVE | COMMUNITY

## 2025-08-11 RX ORDER — SIMVASTATIN 40 MG/1
1 TABLET IN THE EVENING TABLET, FILM COATED ORAL ONCE A DAY
Status: ACTIVE | COMMUNITY

## 2025-08-13 ENCOUNTER — APPOINTMENT (OUTPATIENT)
Dept: URBAN - METROPOLITAN AREA OTHER 5 | Facility: OTHER | Age: 70
Setting detail: DERMATOLOGY
End: 2025-08-13

## 2025-08-13 DIAGNOSIS — D18.0 HEMANGIOMA: ICD-10-CM

## 2025-08-13 DIAGNOSIS — L57.8 OTHER SKIN CHANGES DUE TO CHRONIC EXPOSURE TO NONIONIZING RADIATION: ICD-10-CM

## 2025-08-13 DIAGNOSIS — L57.0 ACTINIC KERATOSIS: ICD-10-CM

## 2025-08-13 DIAGNOSIS — L82.1 OTHER SEBORRHEIC KERATOSIS: ICD-10-CM

## 2025-08-13 DIAGNOSIS — L90.5 SCAR CONDITIONS AND FIBROSIS OF SKIN: ICD-10-CM

## 2025-08-13 PROBLEM — D18.01 HEMANGIOMA OF SKIN AND SUBCUTANEOUS TISSUE: Status: ACTIVE | Noted: 2025-08-13

## 2025-08-13 PROCEDURE — ? SUNSCREEN RECOMMENDATIONS

## 2025-08-13 PROCEDURE — ? COUNSELING

## 2025-08-13 PROCEDURE — ? LIQUID NITROGEN

## 2025-08-13 ASSESSMENT — LOCATION DETAILED DESCRIPTION DERM
LOCATION DETAILED: STERNUM
LOCATION DETAILED: RIGHT MEDIAL UPPER BACK
LOCATION DETAILED: POSTERIOR MID-PARIETAL SCALP
LOCATION DETAILED: LEFT SUPERIOR OCCIPITAL SCALP
LOCATION DETAILED: LEFT MID-UPPER BACK
LOCATION DETAILED: RIGHT SUPERIOR MEDIAL UPPER BACK
LOCATION DETAILED: RIGHT MEDIAL INFERIOR CHEST
LOCATION DETAILED: PERIUMBILICAL SKIN

## 2025-08-13 ASSESSMENT — LOCATION SIMPLE DESCRIPTION DERM
LOCATION SIMPLE: CHEST
LOCATION SIMPLE: RIGHT UPPER BACK
LOCATION SIMPLE: POSTERIOR SCALP
LOCATION SIMPLE: LEFT UPPER BACK
LOCATION SIMPLE: ABDOMEN
LOCATION SIMPLE: LEFT OCCIPITAL SCALP

## 2025-08-13 ASSESSMENT — LOCATION ZONE DERM
LOCATION ZONE: SCALP
LOCATION ZONE: TRUNK

## 2025-08-13 ASSESSMENT — PAIN INTENSITY VAS: HOW INTENSE IS YOUR PAIN 0 BEING NO PAIN, 10 BEING THE MOST SEVERE PAIN POSSIBLE?: NO PAIN
